# Patient Record
Sex: FEMALE | Race: WHITE | NOT HISPANIC OR LATINO | Employment: OTHER | URBAN - METROPOLITAN AREA
[De-identification: names, ages, dates, MRNs, and addresses within clinical notes are randomized per-mention and may not be internally consistent; named-entity substitution may affect disease eponyms.]

---

## 2024-01-23 ENCOUNTER — TELEPHONE (OUTPATIENT)
Dept: ORTHOPEDICS | Facility: CLINIC | Age: 49
End: 2024-01-23
Payer: COMMERCIAL

## 2024-01-23 NOTE — TELEPHONE ENCOUNTER
Called pt and she stated that she would like to follow up with a trauma doctor since her surgery in 10/2023. She stated she had  fall and had surgery at [facility] and after that her knee has not been right since. Informed pt that I would send message to the trauma doctors teams to get her on their schedule. Pt verbalized understanding and has no further questions.    ----- Message from Ingrid Clemons sent at 1/23/2024  4:34 PM CST -----  Regarding: appt access  Contact: pt [phone]  Pt calling to schedule appt for knee due to significant fall. Pls call

## 2024-01-25 ENCOUNTER — TELEPHONE (OUTPATIENT)
Dept: ORTHOPEDICS | Facility: CLINIC | Age: 49
End: 2024-01-25
Payer: COMMERCIAL

## 2024-01-25 DIAGNOSIS — M89.8X6 PAIN OF RIGHT FIBULA: Primary | ICD-10-CM

## 2024-01-25 NOTE — TELEPHONE ENCOUNTER
----- Message from Ro Adair sent at 1/23/2024  4:59 PM CST -----  Regarding: FW: Appt Access  Contact: [phone]  Hey,   I spoke with this patient and she had a slip and fall in 10/2023 and she had surgery at [facility] for a right fractured tibia. She stated that since the surgery she has had issues with her knee. She would like a second opinion from a trauma doctor. Let me know if you want me to schedule pt and contact patient with appointment scheduling time etc.     Thanks,    Ro Adair MS, OTC   Clinical Assistant to Dr. George Chimento [facility]   Phone: [phone]  Fax: [phone]    ----- Message -----  From: Aleja Arredondo  Sent: 1/22/2024  12:51 PM CST  To: Samina BRONSON Staff  Subject: Appt Access                                      SCHEDULING/REQUEST    Appt Type: NP    Date/Time Preference: Any    Treating Provider: Samina    Caller Name: BRANDON JUNE [[id number]]    Contact Preference:   [phone]    Comments/Notes: NP requesting an appt for right fractured tibia.  No appts avail.  Pt only wants to see Dr Haas.  Please call.

## 2024-01-25 NOTE — TELEPHONE ENCOUNTER
Spoke with pt.  She is requesting a second opinion on a right fibula fx s/p ORIF right proximal fibula 10/2023 at outside facility.  Pt will bring recent CT scan images on a disc.  Pt will get new tib fib xrays upon arrival for appointment.  Pt verbalized understanding.

## 2024-02-01 ENCOUNTER — OFFICE VISIT (OUTPATIENT)
Dept: ORTHOPEDICS | Facility: CLINIC | Age: 49
End: 2024-02-01
Payer: COMMERCIAL

## 2024-02-01 ENCOUNTER — HOSPITAL ENCOUNTER (OUTPATIENT)
Dept: RADIOLOGY | Facility: HOSPITAL | Age: 49
Discharge: HOME | End: 2024-02-01
Attending: ORTHOPAEDIC SURGERY
Payer: COMMERCIAL

## 2024-02-01 DIAGNOSIS — M89.8X6 PAIN OF RIGHT FIBULA: ICD-10-CM

## 2024-02-01 DIAGNOSIS — Z01.818 PREOP EXAMINATION: ICD-10-CM

## 2024-02-01 DIAGNOSIS — M89.8X6 PAIN OF RIGHT FIBULA: Primary | ICD-10-CM

## 2024-02-01 DIAGNOSIS — S82.141K CLOSED FRACTURE OF RIGHT TIBIAL PLATEAU WITH NONUNION: Primary | ICD-10-CM

## 2024-02-01 PROCEDURE — 99999 PR PBB SHADOW E&M-EST. PATIENT-LVL III: CPT | Mod: PBBFAC,,, | Performed by: ORTHOPAEDIC SURGERY

## 2024-02-01 PROCEDURE — 4010F ACE/ARB THERAPY RXD/TAKEN: CPT | Mod: CPTII,S$GLB,, | Performed by: ORTHOPAEDIC SURGERY

## 2024-02-01 PROCEDURE — 99204 OFFICE O/P NEW MOD 45 MIN: CPT | Mod: S$GLB,,, | Performed by: ORTHOPAEDIC SURGERY

## 2024-02-01 PROCEDURE — 73590 X-RAY EXAM OF LOWER LEG: CPT | Mod: 26,RT,, | Performed by: INTERNAL MEDICINE

## 2024-02-01 PROCEDURE — 1159F MED LIST DOCD IN RCRD: CPT | Mod: CPTII,S$GLB,, | Performed by: ORTHOPAEDIC SURGERY

## 2024-02-01 PROCEDURE — 73590 X-RAY EXAM OF LOWER LEG: CPT | Mod: TC,RT

## 2024-02-01 RX ORDER — LISINOPRIL AND HYDROCHLOROTHIAZIDE 20; 25 MG/1; MG/1
1 TABLET ORAL NIGHTLY
COMMUNITY

## 2024-02-01 RX ORDER — DULOXETIN HYDROCHLORIDE 30 MG/1
30 CAPSULE, DELAYED RELEASE ORAL NIGHTLY
COMMUNITY

## 2024-02-01 RX ORDER — TRAZODONE HYDROCHLORIDE 50 MG/1
50 TABLET ORAL NIGHTLY
COMMUNITY

## 2024-02-01 RX ORDER — ZOLPIDEM TARTRATE 5 MG/1
5 TABLET ORAL NIGHTLY PRN
COMMUNITY

## 2024-02-01 RX ORDER — ROSUVASTATIN CALCIUM 20 MG/1
20 TABLET, COATED ORAL NIGHTLY
COMMUNITY

## 2024-02-01 NOTE — PROGRESS NOTES
CC:  Right tibial plateau nonunion      HISTORY       HPI:  48-year-old female, fall from step stool October 7, 2023.  Seen at outside hospital, bluntly [place]  Right bicondylar tibial plateau fracture.  Treated urgently with ORIF, Dr. Garcia, anterior midline incision followed by medial and lateral plating.    Had some wound issues postoperatively requiring wound care and hyperbaric oxygen.  This eventually cleared up.      She has had persistent right knee pain, inability to bear weight since the time of injury.    Knee feels unstable  There is visible gross motion at the fracture site  She is not on antibiotics, but previously was on bactrim and clinda    Lives at home with    Previously independent community ambulator   Self-employed as a realtor   Denies diabetes, heart attack, stroke, blood clot, cancer  Denies tobacco use      ROS:  Constitutional: Denies fever/chills  Neurological: Denies numbness/tingling (any exceptions noted in orthopaedic exam)   Psychiatric/Behavioral: Denies change in normal mood  Eyes: Denies change in vision  Cardiovascular: Denies chest pain  Respiratory: Denies shortness of breath  Hematologic/Lymphatic: Denies easy bleeding/bruising   Skin: Denies new rash or skin lesions   Gastrointestinal: Denies nausea/vomitting/diarrhea, change in bowel habits, abdominal pain   Allergic/Immunologic: Denies adverse reactions to current medications  Musculoskeletal: see HPI    PAST MEDICAL HISTORY: History reviewed. No pertinent past medical history.  PAST SURGICAL HISTORY: History reviewed. No pertinent surgical history.  FAMILY HISTORY: History reviewed. No pertinent family history.  SOCIAL HISTORY:   Social History     Socioeconomic History    Marital status:      MEDICATIONS:   Current Outpatient Medications:     DULoxetine (CYMBALTA) 30 MG capsule, Take 30 mg by mouth once daily., Disp: , Rfl:     lisinopriL-hydrochlorothiazide (PRINZIDE,ZESTORETIC) 20-25 mg Tab, Take  1 tablet by mouth once daily., Disp: , Rfl:     rosuvastatin (CRESTOR) 20 MG tablet, Take 20 mg by mouth once daily., Disp: , Rfl:     traZODone (DESYREL) 50 MG tablet, Take 50 mg by mouth every evening., Disp: , Rfl:     zolpidem (AMBIEN) 5 MG Tab, Take 5 mg by mouth nightly as needed., Disp: , Rfl:     traMADoL (ULTRAM) 50 mg tablet, Take 1 tablet (50 mg total) by mouth every 8 (eight) hours as needed for Pain., Disp: 21 tablet, Rfl: 0  ALLERGIES: Review of patient's allergies indicates:  No Known Allergies      EXAM      VITAL SIGNS:   There were no vitals taken for this visit.      PE:  General:  no acute distress, appears stated age    Neuro: alert and oriented x3  Psych: normal mood  Head: normocephalic, atraumatic.   Eyes: no scleral icterus  Mouth: moist mucous membranes  Cardiovascular: extremities warm and well perfused  Lungs: breathing comfortably, equal chest rise bilat  Skin: clean, dry, intact (any exceptions noted in below musculoskeletal exam)    Musculoskeletal:  Right lower extremity   Midline anterior knee incision that is well healed, though at the inferior lateral aspect there was a prior wound that appears to be healed now.  There was gross motion at the fracture site metaphyseal region  No real erythema, fluctuance or knee effusion at this time  Unable to perform a straight leg raise due to pain and fracture motion  Motor function intact  hamstring, gastroc, tibialis anterior, peroneal, EHL, FHL  Sensation intact to light touch saphenous, sural, deep peroneal, superficial peroneal, tibial nerves.  Palpable DP/PT pulse, brisk cap refill        XRAYS:  X-ray right tibia demonstrate prior bicondylar tibial plateau fracture with medial and lateral plates as well as multiple independent screws.  The articular surface appears to be reasonably well aligned.  There is some interval healing of the proximal fibula fracture.  There was a nonunion at the metaphyseal portion of the tibial plateau/proximal  tibia, apex anterior angulation with a gap of approximately 12 mm along the anterior tibial cortex.  (I independently reviewed and interpreted the above imaging)      Lab Results   Component Value Date    CRP 3.1 02/01/2024     Lab Results   Component Value Date    SEDRATE 23 02/01/2024       Lab Results   Component Value Date    WBC 8.84 02/01/2024    HGB 14.2 02/01/2024    HCT 45.5 02/01/2024    MCV 91 02/01/2024     02/01/2024         CMP  Sodium   Date Value Ref Range Status   02/01/2024 138 136 - 145 mmol/L Final     Potassium   Date Value Ref Range Status   02/01/2024 3.8 3.5 - 5.1 mmol/L Final     Chloride   Date Value Ref Range Status   02/01/2024 104 95 - 110 mmol/L Final     CO2   Date Value Ref Range Status   02/01/2024 25 23 - 29 mmol/L Final     Glucose   Date Value Ref Range Status   02/01/2024 98 70 - 110 mg/dL Final     BUN   Date Value Ref Range Status   02/01/2024 17 6 - 20 mg/dL Final     Creatinine   Date Value Ref Range Status   02/01/2024 0.8 0.5 - 1.4 mg/dL Final     Calcium   Date Value Ref Range Status   02/01/2024 10.4 8.7 - 10.5 mg/dL Final     Total Protein   Date Value Ref Range Status   02/01/2024 7.7 6.0 - 8.4 g/dL Final     Albumin   Date Value Ref Range Status   02/01/2024 4.1 3.5 - 5.2 g/dL Final     Total Bilirubin   Date Value Ref Range Status   02/01/2024 0.3 0.1 - 1.0 mg/dL Final     Comment:     For infants and newborns, interpretation of results should be based  on gestational age, weight and in agreement with clinical  observations.    Premature Infant recommended reference ranges:  Up to 24 hours.............<8.0 mg/dL  Up to 48 hours............<12.0 mg/dL  3-5 days..................<15.0 mg/dL  6-29 days.................<15.0 mg/dL       Alkaline Phosphatase   Date Value Ref Range Status   02/01/2024 122 55 - 135 U/L Final     AST   Date Value Ref Range Status   02/01/2024 26 10 - 40 U/L Final     ALT   Date Value Ref Range Status   02/01/2024 44 10 - 44 U/L Final      Anion Gap   Date Value Ref Range Status   02/01/2024 9 8 - 16 mmol/L Final     eGFR   Date Value Ref Range Status   02/01/2024 >60.0 >60 mL/min/1.73 m^2 Final         MEDICAL DECISION MAKING       Encounter Diagnoses   Name Primary?    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    Now has continued gross motion at fracture site as well as nonunion on x-ray.  History of wound problems requiring wound care and antibiotics.      I have concern that this represents an infected nonunion.  Regardless there is significant nonunion with gross motion at the fracture site.      I do not feel that this would heal with non operative management due to this persistent gross motion.    Counseled patient on diagnosis     Discussed non operative management   This would consist of splinting, bracing.  I have concern that this would not be successful due to concern for infection and significant motion at the fracture site.    Discussed operative intervention   Due to concern for her infection in the setting of unstable hardware and gross fracture motion I would recommend the following   Worked through the prior anterior knee incision and remove all the hardware.  Debride any bone that appears to be devitalized or infected.  Take multiple cultures.  Hopefully we can then realign the fracture, and perform a antibiotic coated nail for control the metaphyseal fracture zone.  If there is gross infection throughout this whole area, we may have to perform temporizing measure with an external fixator.  We may be later required to convert to a ringed external fixator depending on soft tissue quality fracture characteristics, bone loss, etc..  Due to prior wound issues, anterior knee incision, the wound may not be closable,  where she may have further wound complications.  With this in mind, she may require assistance from Plastic surgery to close these wounds.    I discussed that regardless of the future treatment, given the unstable high energy type fracture prior wound complications, she is at risk for loss of limb, and above-knee amputation.  My main focus is decreasing the risk of this complication.  Hopefully with operative intervention, removing all the hardware, hopefully decreasing nidus for infection, improving the alignment, and the stability, we could both eradicate the infection and cause bony healing, improving her early and long-term function of this lower extremity.    As an alternative surgical procedure I also discussed keeping this current hardware in place, supplemented it with additional fixation, however given my concern for possible deep infection with grossly unstable fixation, I feel that this treatment would both compromise my goal of removing any and all infection, as well as improving stability, as the current hardware that is in place limits the possibility of getting robust enough fixation without its removal    Plan for revision surgery later this month  Will discuss w/ plastic surgery the potential of soft tissue coverage needs    --weight bearing:  Nonweightbearing right lower extremity  --followup:  For surgery        =====================  Nicho Balderas MD  Orthopaedic Surgery

## 2024-02-02 ENCOUNTER — TELEPHONE (OUTPATIENT)
Dept: ORTHOPEDICS | Facility: CLINIC | Age: 49
End: 2024-02-02
Payer: COMMERCIAL

## 2024-02-02 NOTE — TELEPHONE ENCOUNTER
Spoke with pt stated [place]. Stated to pt to find a pharmacy in [place]. Pt verbalize understands.

## 2024-02-02 NOTE — TELEPHONE ENCOUNTER
----- Message from Kasey Snyder sent at 2/2/2024  2:07 PM CST -----  Regarding: pt advice  Contact: [phone]  Pt requesting Pharmacy update    [facility] [facility]   Phone: [phone]  Fax: [phone]    Please call pt @[phone]

## 2024-02-05 DIAGNOSIS — S82.141K CLOSED FRACTURE OF RIGHT TIBIAL PLATEAU WITH NONUNION: Primary | ICD-10-CM

## 2024-02-05 RX ORDER — TRAMADOL HYDROCHLORIDE 50 MG/1
50 TABLET ORAL EVERY 8 HOURS PRN
Qty: 21 TABLET | Refills: 0 | Status: SHIPPED | OUTPATIENT
Start: 2024-02-05 | End: 2024-02-13 | Stop reason: SDUPTHER

## 2024-02-07 ENCOUNTER — PATIENT MESSAGE (OUTPATIENT)
Dept: ORTHOPEDICS | Facility: CLINIC | Age: 49
End: 2024-02-07
Payer: COMMERCIAL

## 2024-02-13 DIAGNOSIS — S82.141K CLOSED FRACTURE OF RIGHT TIBIAL PLATEAU WITH NONUNION: ICD-10-CM

## 2024-02-14 ENCOUNTER — PATIENT MESSAGE (OUTPATIENT)
Dept: ORTHOPEDICS | Facility: CLINIC | Age: 49
End: 2024-02-14
Payer: COMMERCIAL

## 2024-02-14 RX ORDER — TRAMADOL HYDROCHLORIDE 50 MG/1
50 TABLET ORAL EVERY 8 HOURS PRN
Qty: 21 TABLET | Refills: 0 | Status: SHIPPED | OUTPATIENT
Start: 2024-02-14 | End: 2024-05-29 | Stop reason: SDUPTHER

## 2024-02-15 NOTE — PRE-PROCEDURE INSTRUCTIONS
PREOP INSTRUCTIONS:  No food,milk or milk products for 8 hours before surgery.  Clear liquids like water,gatorade,apple juice are allowed up until 2 hours before surgery.  Instructed to follow the surgeon's instructions if they differ from these.  Shower instructions as well as directions to the Surgery Center were given.  Encouraged to wear loose fitting,comfortable clothing.  Medication instructions for pm prior to and am of procedure reviewed.  Instructed to avoid taking vitamins,supplements,aspirin and ibuprofen the morning of surgery.    Patient denies any side effects or issues with anesthesia or sedation.     Patient does not know arrival time.Explained that this information comes from the surgeon's office and if they haven't heard from them by 2 or 3 pm 2/16/2024 to call the office.Patient stated an understanding.

## 2024-02-16 ENCOUNTER — ANESTHESIA EVENT (OUTPATIENT)
Dept: SURGERY | Facility: HOSPITAL | Age: 49
DRG: 493 | End: 2024-02-16
Payer: COMMERCIAL

## 2024-02-16 ENCOUNTER — TELEPHONE (OUTPATIENT)
Dept: ORTHOPEDICS | Facility: CLINIC | Age: 49
End: 2024-02-16
Payer: COMMERCIAL

## 2024-02-16 NOTE — TELEPHONE ENCOUNTER
----- Message from Landen Vazquez sent at 2/16/2024  1:09 PM CST -----  Regarding: PT IS CALLING FOR THE ARRIVAL TIME FOR MONDAYS SURGERY  Contact: PT  Confirmed contact info below:  Contact Name: Neeta Perryher  Phone Number: [phone]

## 2024-02-16 NOTE — TELEPHONE ENCOUNTER
Spoke with pt in regards to sx scheduled for Monday 2/19/2024 stated to pt no eating/drinking after midnight arrival time 5am and she is to report to 2nd floor [facility]. Pt verbalize understands.

## 2024-02-19 ENCOUNTER — ANESTHESIA (OUTPATIENT)
Dept: SURGERY | Facility: HOSPITAL | Age: 49
DRG: 493 | End: 2024-02-19
Payer: COMMERCIAL

## 2024-02-19 ENCOUNTER — HOSPITAL ENCOUNTER (INPATIENT)
Facility: HOSPITAL | Age: 49
LOS: 2 days | Discharge: HOME | DRG: 493 | End: 2024-02-21
Attending: ORTHOPAEDIC SURGERY | Admitting: ORTHOPAEDIC SURGERY
Payer: COMMERCIAL

## 2024-02-19 DIAGNOSIS — S82.141K CLOSED FRACTURE OF RIGHT TIBIAL PLATEAU WITH NONUNION: ICD-10-CM

## 2024-02-19 LAB
ALBUMIN SERPL BCP-MCNC: 3.4 G/DL (ref 3.5–5.2)
ALP SERPL-CCNC: 91 U/L (ref 55–135)
ALT SERPL W/O P-5'-P-CCNC: 33 U/L (ref 10–44)
ANION GAP SERPL CALC-SCNC: 13 MMOL/L (ref 8–16)
AST SERPL-CCNC: 26 U/L (ref 10–40)
B-HCG UR QL: NEGATIVE
BILIRUB SERPL-MCNC: 0.2 MG/DL (ref 0.1–1)
BUN SERPL-MCNC: 16 MG/DL (ref 6–20)
CALCIUM SERPL-MCNC: 8.8 MG/DL (ref 8.7–10.5)
CHLORIDE SERPL-SCNC: 108 MMOL/L (ref 95–110)
CO2 SERPL-SCNC: 18 MMOL/L (ref 23–29)
CREAT SERPL-MCNC: 0.8 MG/DL (ref 0.5–1.4)
CTP QC/QA: YES
EST. GFR  (NO RACE VARIABLE): >60 ML/MIN/1.73 M^2
GLUCOSE SERPL-MCNC: 154 MG/DL (ref 70–110)
GRAM STN SPEC: NORMAL
POTASSIUM SERPL-SCNC: 4.1 MMOL/L (ref 3.5–5.1)
PROT SERPL-MCNC: 6.3 G/DL (ref 6–8.4)
SODIUM SERPL-SCNC: 139 MMOL/L (ref 136–145)

## 2024-02-19 PROCEDURE — 64450 NJX AA&/STRD OTHER PN/BRANCH: CPT | Mod: 59,RT,, | Performed by: SURGERY

## 2024-02-19 PROCEDURE — 36000710: Performed by: ORTHOPAEDIC SURGERY

## 2024-02-19 PROCEDURE — 87205 SMEAR GRAM STAIN: CPT | Mod: 59 | Performed by: ORTHOPAEDIC SURGERY

## 2024-02-19 PROCEDURE — 63600175 PHARM REV CODE 636 W HCPCS: Performed by: STUDENT IN AN ORGANIZED HEALTH CARE EDUCATION/TRAINING PROGRAM

## 2024-02-19 PROCEDURE — 37000009 HC ANESTHESIA EA ADD 15 MINS: Performed by: ORTHOPAEDIC SURGERY

## 2024-02-19 PROCEDURE — 25000003 PHARM REV CODE 250: Performed by: ANESTHESIOLOGY

## 2024-02-19 PROCEDURE — 80053 COMPREHEN METABOLIC PANEL: CPT | Performed by: ORTHOPAEDIC SURGERY

## 2024-02-19 PROCEDURE — 25000003 PHARM REV CODE 250: Performed by: ORTHOPAEDIC SURGERY

## 2024-02-19 PROCEDURE — D9220A PRA ANESTHESIA: Mod: ANES,,, | Performed by: ANESTHESIOLOGY

## 2024-02-19 PROCEDURE — 25000003 PHARM REV CODE 250: Performed by: NURSE ANESTHETIST, CERTIFIED REGISTERED

## 2024-02-19 PROCEDURE — 0QSG06Z REPOSITION RIGHT TIBIA WITH INTRAMEDULLARY INTERNAL FIXATION DEVICE, OPEN APPROACH: ICD-10-PCS | Performed by: ORTHOPAEDIC SURGERY

## 2024-02-19 PROCEDURE — 63600175 PHARM REV CODE 636 W HCPCS: Performed by: NURSE ANESTHETIST, CERTIFIED REGISTERED

## 2024-02-19 PROCEDURE — 63600175 PHARM REV CODE 636 W HCPCS: Performed by: SURGERY

## 2024-02-19 PROCEDURE — 63600175 PHARM REV CODE 636 W HCPCS: Performed by: ORTHOPAEDIC SURGERY

## 2024-02-19 PROCEDURE — 0QSG04Z REPOSITION RIGHT TIBIA WITH INTERNAL FIXATION DEVICE, OPEN APPROACH: ICD-10-PCS | Performed by: ORTHOPAEDIC SURGERY

## 2024-02-19 PROCEDURE — 87070 CULTURE OTHR SPECIMN AEROBIC: CPT | Performed by: ORTHOPAEDIC SURGERY

## 2024-02-19 PROCEDURE — A6010 COLLAGEN BASED WOUND FILLER: HCPCS | Performed by: ORTHOPAEDIC SURGERY

## 2024-02-19 PROCEDURE — 87116 MYCOBACTERIA CULTURE: CPT | Performed by: ORTHOPAEDIC SURGERY

## 2024-02-19 PROCEDURE — C1889 IMPLANT/INSERT DEVICE, NOC: HCPCS | Performed by: ORTHOPAEDIC SURGERY

## 2024-02-19 PROCEDURE — 71000016 HC POSTOP RECOV ADDL HR: Performed by: ORTHOPAEDIC SURGERY

## 2024-02-19 PROCEDURE — 27724 REPAIR/GRAFT OF TIBIA: CPT | Mod: RT,,, | Performed by: ORTHOPAEDIC SURGERY

## 2024-02-19 PROCEDURE — 81025 URINE PREGNANCY TEST: CPT | Performed by: ORTHOPAEDIC SURGERY

## 2024-02-19 PROCEDURE — 36415 COLL VENOUS BLD VENIPUNCTURE: CPT | Performed by: ORTHOPAEDIC SURGERY

## 2024-02-19 PROCEDURE — 87102 FUNGUS ISOLATION CULTURE: CPT | Performed by: ORTHOPAEDIC SURGERY

## 2024-02-19 PROCEDURE — 36000711: Performed by: ORTHOPAEDIC SURGERY

## 2024-02-19 PROCEDURE — 71000039 HC RECOVERY, EACH ADD'L HOUR: Performed by: ORTHOPAEDIC SURGERY

## 2024-02-19 PROCEDURE — 25000003 PHARM REV CODE 250: Performed by: STUDENT IN AN ORGANIZED HEALTH CARE EDUCATION/TRAINING PROGRAM

## 2024-02-19 PROCEDURE — 27201423 OPTIME MED/SURG SUP & DEVICES STERILE SUPPLY: Performed by: ORTHOPAEDIC SURGERY

## 2024-02-19 PROCEDURE — 87206 SMEAR FLUORESCENT/ACID STAI: CPT | Mod: 91 | Performed by: ORTHOPAEDIC SURGERY

## 2024-02-19 PROCEDURE — 71000033 HC RECOVERY, INTIAL HOUR: Performed by: ORTHOPAEDIC SURGERY

## 2024-02-19 PROCEDURE — 71000015 HC POSTOP RECOV 1ST HR: Performed by: ORTHOPAEDIC SURGERY

## 2024-02-19 PROCEDURE — 15738 MUSCLE-SKIN GRAFT LEG: CPT | Mod: 51,RT,, | Performed by: ORTHOPAEDIC SURGERY

## 2024-02-19 PROCEDURE — 0QPG04Z REMOVAL OF INTERNAL FIXATION DEVICE FROM RIGHT TIBIA, OPEN APPROACH: ICD-10-PCS | Performed by: ORTHOPAEDIC SURGERY

## 2024-02-19 PROCEDURE — 0QUG0KZ SUPPLEMENT RIGHT TIBIA WITH NONAUTOLOGOUS TISSUE SUBSTITUTE, OPEN APPROACH: ICD-10-PCS | Performed by: ORTHOPAEDIC SURGERY

## 2024-02-19 PROCEDURE — 0JXN0ZZ TRANSFER RIGHT LOWER LEG SUBCUTANEOUS TISSUE AND FASCIA, OPEN APPROACH: ICD-10-PCS | Performed by: ORTHOPAEDIC SURGERY

## 2024-02-19 PROCEDURE — D9220A PRA ANESTHESIA: Mod: CRNA,,, | Performed by: NURSE ANESTHETIST, CERTIFIED REGISTERED

## 2024-02-19 PROCEDURE — 11000001 HC ACUTE MED/SURG PRIVATE ROOM

## 2024-02-19 PROCEDURE — C1713 ANCHOR/SCREW BN/BN,TIS/BN: HCPCS | Performed by: ORTHOPAEDIC SURGERY

## 2024-02-19 PROCEDURE — 63600175 PHARM REV CODE 636 W HCPCS: Performed by: ANESTHESIOLOGY

## 2024-02-19 PROCEDURE — 37000008 HC ANESTHESIA 1ST 15 MINUTES: Performed by: ORTHOPAEDIC SURGERY

## 2024-02-19 PROCEDURE — 94761 N-INVAS EAR/PLS OXIMETRY MLT: CPT

## 2024-02-19 PROCEDURE — 63600175 PHARM REV CODE 636 W HCPCS

## 2024-02-19 PROCEDURE — 0QUG07Z SUPPLEMENT RIGHT TIBIA WITH AUTOLOGOUS TISSUE SUBSTITUTE, OPEN APPROACH: ICD-10-PCS | Performed by: ORTHOPAEDIC SURGERY

## 2024-02-19 PROCEDURE — 87075 CULTR BACTERIA EXCEPT BLOOD: CPT | Performed by: ORTHOPAEDIC SURGERY

## 2024-02-19 PROCEDURE — 64447 NJX AA&/STRD FEMORAL NRV IMG: CPT | Mod: 59,RT,, | Performed by: SURGERY

## 2024-02-19 PROCEDURE — C1769 GUIDE WIRE: HCPCS | Performed by: ORTHOPAEDIC SURGERY

## 2024-02-19 DEVICE — BONE CHIP CANC 1.7-10MM 15ML: Type: IMPLANTABLE DEVICE | Site: TIBIA | Status: FUNCTIONAL

## 2024-02-19 DEVICE — SCREW LOCKING BONE T2 5X47MM: Type: IMPLANTABLE DEVICE | Site: TIBIA | Status: FUNCTIONAL

## 2024-02-19 DEVICE — GRAFT VITOSS BONE SUB BBT 5CC: Type: IMPLANTABLE DEVICE | Site: TIBIA | Status: FUNCTIONAL

## 2024-02-19 DEVICE — SCREW LOCKING BONE T2 5X70MM: Type: IMPLANTABLE DEVICE | Site: TIBIA | Status: FUNCTIONAL

## 2024-02-19 DEVICE — SCREW BONE NON LOCK 3.5X34MM: Type: IMPLANTABLE DEVICE | Site: TIBIA | Status: FUNCTIONAL

## 2024-02-19 DEVICE — SCREW LOCKING BONE T2 5X50MM: Type: IMPLANTABLE DEVICE | Site: TIBIA | Status: FUNCTIONAL

## 2024-02-19 DEVICE — COLLAGEN CELLERATE ACTIVATED 1GM: Type: IMPLANTABLE DEVICE | Site: TIBIA | Status: FUNCTIONAL

## 2024-02-19 DEVICE — IMPLANTABLE DEVICE: Type: IMPLANTABLE DEVICE | Site: TIBIA | Status: FUNCTIONAL

## 2024-02-19 DEVICE — SCREW LOCKING BONE T2 5X37MM: Type: IMPLANTABLE DEVICE | Site: TIBIA | Status: FUNCTIONAL

## 2024-02-19 DEVICE — CEMENT BONE WHOLE BATCH: Type: IMPLANTABLE DEVICE | Site: TIBIA | Status: FUNCTIONAL

## 2024-02-19 RX ORDER — HALOPERIDOL 5 MG/ML
0.5 INJECTION INTRAMUSCULAR EVERY 10 MIN PRN
Status: DISCONTINUED | OUTPATIENT
Start: 2024-02-19 | End: 2024-02-19 | Stop reason: HOSPADM

## 2024-02-19 RX ORDER — AMOXICILLIN 250 MG
1 CAPSULE ORAL DAILY
Status: DISCONTINUED | OUTPATIENT
Start: 2024-02-19 | End: 2024-02-21 | Stop reason: HOSPADM

## 2024-02-19 RX ORDER — POLYETHYLENE GLYCOL 3350 17 G/17G
17 POWDER, FOR SOLUTION ORAL DAILY PRN
Status: DISCONTINUED | OUTPATIENT
Start: 2024-02-19 | End: 2024-02-21

## 2024-02-19 RX ORDER — FENTANYL CITRATE 50 UG/ML
INJECTION, SOLUTION INTRAMUSCULAR; INTRAVENOUS
Status: COMPLETED
Start: 2024-02-19 | End: 2024-02-19

## 2024-02-19 RX ORDER — ACETAMINOPHEN 500 MG
1000 TABLET ORAL EVERY 6 HOURS
Status: DISCONTINUED | OUTPATIENT
Start: 2024-02-19 | End: 2024-02-21 | Stop reason: HOSPADM

## 2024-02-19 RX ORDER — SCOLOPAMINE TRANSDERMAL SYSTEM 1 MG/1
1 PATCH, EXTENDED RELEASE TRANSDERMAL
Status: DISCONTINUED | OUTPATIENT
Start: 2024-02-19 | End: 2024-02-21 | Stop reason: HOSPADM

## 2024-02-19 RX ORDER — DULOXETIN HYDROCHLORIDE 30 MG/1
30 CAPSULE, DELAYED RELEASE ORAL NIGHTLY
Status: DISCONTINUED | OUTPATIENT
Start: 2024-02-19 | End: 2024-02-21 | Stop reason: HOSPADM

## 2024-02-19 RX ORDER — ACETAMINOPHEN 10 MG/ML
INJECTION, SOLUTION INTRAVENOUS
Status: DISCONTINUED | OUTPATIENT
Start: 2024-02-19 | End: 2024-02-19

## 2024-02-19 RX ORDER — FENTANYL CITRATE 50 UG/ML
INJECTION, SOLUTION INTRAMUSCULAR; INTRAVENOUS
Status: DISCONTINUED | OUTPATIENT
Start: 2024-02-19 | End: 2024-02-19

## 2024-02-19 RX ORDER — ATORVASTATIN CALCIUM 40 MG/1
40 TABLET, FILM COATED ORAL DAILY
Status: DISCONTINUED | OUTPATIENT
Start: 2024-02-19 | End: 2024-02-21 | Stop reason: HOSPADM

## 2024-02-19 RX ORDER — OXYCODONE HYDROCHLORIDE 5 MG/1
5 TABLET ORAL EVERY 6 HOURS PRN
Qty: 42 TABLET | Refills: 0 | Status: SHIPPED | OUTPATIENT
Start: 2024-02-19 | End: 2024-02-21 | Stop reason: HOSPADM

## 2024-02-19 RX ORDER — MUPIROCIN 20 MG/G
OINTMENT TOPICAL
Status: DISCONTINUED | OUTPATIENT
Start: 2024-02-19 | End: 2024-02-19 | Stop reason: HOSPADM

## 2024-02-19 RX ORDER — OXYCODONE HYDROCHLORIDE 5 MG/1
5 TABLET ORAL EVERY 4 HOURS PRN
Status: DISCONTINUED | OUTPATIENT
Start: 2024-02-19 | End: 2024-02-21 | Stop reason: HOSPADM

## 2024-02-19 RX ORDER — ROCURONIUM BROMIDE 10 MG/ML
INJECTION, SOLUTION INTRAVENOUS
Status: DISCONTINUED | OUTPATIENT
Start: 2024-02-19 | End: 2024-02-19

## 2024-02-19 RX ORDER — LABETALOL HYDROCHLORIDE 5 MG/ML
INJECTION, SOLUTION INTRAVENOUS
Status: DISCONTINUED | OUTPATIENT
Start: 2024-02-19 | End: 2024-02-19

## 2024-02-19 RX ORDER — ROPIVACAINE HYDROCHLORIDE 5 MG/ML
INJECTION, SOLUTION EPIDURAL; INFILTRATION; PERINEURAL
Status: COMPLETED | OUTPATIENT
Start: 2024-02-19 | End: 2024-02-19

## 2024-02-19 RX ORDER — OXYCODONE HYDROCHLORIDE 10 MG/1
10 TABLET ORAL EVERY 4 HOURS PRN
Status: DISCONTINUED | OUTPATIENT
Start: 2024-02-19 | End: 2024-02-21 | Stop reason: HOSPADM

## 2024-02-19 RX ORDER — ASPIRIN 81 MG/1
81 TABLET ORAL 2 TIMES DAILY
Qty: 60 TABLET | Refills: 0 | Status: SHIPPED | OUTPATIENT
Start: 2024-02-19 | End: 2024-03-22

## 2024-02-19 RX ORDER — ONDANSETRON HYDROCHLORIDE 2 MG/ML
INJECTION, SOLUTION INTRAVENOUS
Status: DISCONTINUED | OUTPATIENT
Start: 2024-02-19 | End: 2024-02-19

## 2024-02-19 RX ORDER — DOXYCYCLINE 100 MG/1
100 CAPSULE ORAL 2 TIMES DAILY
Qty: 28 CAPSULE | Refills: 0 | Status: SHIPPED | OUTPATIENT
Start: 2024-02-19 | End: 2024-03-12 | Stop reason: SDUPTHER

## 2024-02-19 RX ORDER — TALC
6 POWDER (GRAM) TOPICAL NIGHTLY PRN
Status: DISCONTINUED | OUTPATIENT
Start: 2024-02-19 | End: 2024-02-21 | Stop reason: HOSPADM

## 2024-02-19 RX ORDER — FENTANYL CITRATE 50 UG/ML
25-200 INJECTION, SOLUTION INTRAMUSCULAR; INTRAVENOUS
Status: DISCONTINUED | OUTPATIENT
Start: 2024-02-19 | End: 2024-02-19

## 2024-02-19 RX ORDER — VANCOMYCIN HYDROCHLORIDE 1 G/20ML
INJECTION, POWDER, LYOPHILIZED, FOR SOLUTION INTRAVENOUS
Status: DISCONTINUED | OUTPATIENT
Start: 2024-02-19 | End: 2024-02-19 | Stop reason: HOSPADM

## 2024-02-19 RX ORDER — METHOCARBAMOL 750 MG/1
750 TABLET, FILM COATED ORAL 3 TIMES DAILY
Status: DISCONTINUED | OUTPATIENT
Start: 2024-02-19 | End: 2024-02-21 | Stop reason: HOSPADM

## 2024-02-19 RX ORDER — CEFAZOLIN 2 G/1
INJECTION, POWDER, FOR SOLUTION INTRAMUSCULAR; INTRAVENOUS
Status: DISCONTINUED | OUTPATIENT
Start: 2024-02-19 | End: 2024-02-19

## 2024-02-19 RX ORDER — DEXAMETHASONE SODIUM PHOSPHATE 4 MG/ML
INJECTION, SOLUTION INTRA-ARTICULAR; INTRALESIONAL; INTRAMUSCULAR; INTRAVENOUS; SOFT TISSUE
Status: DISCONTINUED | OUTPATIENT
Start: 2024-02-19 | End: 2024-02-19

## 2024-02-19 RX ORDER — DEXMEDETOMIDINE HYDROCHLORIDE 100 UG/ML
INJECTION, SOLUTION INTRAVENOUS
Status: DISCONTINUED | OUTPATIENT
Start: 2024-02-19 | End: 2024-02-19

## 2024-02-19 RX ORDER — MORPHINE SULFATE 2 MG/ML
2 INJECTION, SOLUTION INTRAMUSCULAR; INTRAVENOUS
Status: DISCONTINUED | OUTPATIENT
Start: 2024-02-19 | End: 2024-02-20

## 2024-02-19 RX ORDER — HYDROMORPHONE HYDROCHLORIDE 1 MG/ML
0.2 INJECTION, SOLUTION INTRAMUSCULAR; INTRAVENOUS; SUBCUTANEOUS EVERY 5 MIN PRN
Status: DISCONTINUED | OUTPATIENT
Start: 2024-02-19 | End: 2024-02-19 | Stop reason: HOSPADM

## 2024-02-19 RX ORDER — TRANEXAMIC ACID 100 MG/ML
INJECTION, SOLUTION INTRAVENOUS
Status: DISCONTINUED | OUTPATIENT
Start: 2024-02-19 | End: 2024-02-19

## 2024-02-19 RX ORDER — PROPOFOL 10 MG/ML
VIAL (ML) INTRAVENOUS
Status: DISCONTINUED | OUTPATIENT
Start: 2024-02-19 | End: 2024-02-19

## 2024-02-19 RX ORDER — MIDAZOLAM HYDROCHLORIDE 1 MG/ML
.5-4 INJECTION INTRAMUSCULAR; INTRAVENOUS
Status: DISPENSED | OUTPATIENT
Start: 2024-02-19

## 2024-02-19 RX ORDER — LIDOCAINE HYDROCHLORIDE 20 MG/ML
INJECTION, SOLUTION EPIDURAL; INFILTRATION; INTRACAUDAL; PERINEURAL
Status: DISCONTINUED | OUTPATIENT
Start: 2024-02-19 | End: 2024-02-19

## 2024-02-19 RX ORDER — LIDOCAINE HYDROCHLORIDE 10 MG/ML
1 INJECTION, SOLUTION EPIDURAL; INFILTRATION; INTRACAUDAL; PERINEURAL ONCE AS NEEDED
Status: COMPLETED | OUTPATIENT
Start: 2024-02-19 | End: 2024-02-19

## 2024-02-19 RX ORDER — SODIUM CHLORIDE 9 MG/ML
INJECTION, SOLUTION INTRAVENOUS CONTINUOUS
Status: DISCONTINUED | OUTPATIENT
Start: 2024-02-19 | End: 2024-02-21 | Stop reason: HOSPADM

## 2024-02-19 RX ORDER — ONDANSETRON HYDROCHLORIDE 2 MG/ML
4 INJECTION, SOLUTION INTRAVENOUS DAILY PRN
Status: DISCONTINUED | OUTPATIENT
Start: 2024-02-19 | End: 2024-02-19 | Stop reason: HOSPADM

## 2024-02-19 RX ORDER — SODIUM CHLORIDE 0.9 % (FLUSH) 0.9 %
10 SYRINGE (ML) INJECTION
Status: DISCONTINUED | OUTPATIENT
Start: 2024-02-19 | End: 2024-02-21 | Stop reason: HOSPADM

## 2024-02-19 RX ORDER — MIDAZOLAM HYDROCHLORIDE 1 MG/ML
INJECTION, SOLUTION INTRAMUSCULAR; INTRAVENOUS
Status: DISCONTINUED | OUTPATIENT
Start: 2024-02-19 | End: 2024-02-19

## 2024-02-19 RX ORDER — ASPIRIN 81 MG/1
81 TABLET ORAL 2 TIMES DAILY
Status: DISCONTINUED | OUTPATIENT
Start: 2024-02-19 | End: 2024-02-21 | Stop reason: HOSPADM

## 2024-02-19 RX ORDER — METHOCARBAMOL 750 MG/1
750 TABLET, FILM COATED ORAL 3 TIMES DAILY PRN
Qty: 21 TABLET | Refills: 0 | Status: SHIPPED | OUTPATIENT
Start: 2024-02-19 | End: 2024-02-27 | Stop reason: SDUPTHER

## 2024-02-19 RX ORDER — ACETAMINOPHEN 500 MG
1000 TABLET ORAL EVERY 8 HOURS
Qty: 60 TABLET | Refills: 0 | Status: SHIPPED | OUTPATIENT
Start: 2024-02-19 | End: 2024-03-20

## 2024-02-19 RX ORDER — KETAMINE HCL IN 0.9 % NACL 50 MG/5 ML
SYRINGE (ML) INTRAVENOUS
Status: DISCONTINUED | OUTPATIENT
Start: 2024-02-19 | End: 2024-02-19

## 2024-02-19 RX ORDER — ONDANSETRON 4 MG/1
8 TABLET, FILM COATED ORAL EVERY 6 HOURS PRN
Status: DISCONTINUED | OUTPATIENT
Start: 2024-02-19 | End: 2024-02-21 | Stop reason: HOSPADM

## 2024-02-19 RX ORDER — TRAMADOL HYDROCHLORIDE 50 MG/1
50 TABLET ORAL EVERY 6 HOURS PRN
Status: DISCONTINUED | OUTPATIENT
Start: 2024-02-19 | End: 2024-02-21 | Stop reason: HOSPADM

## 2024-02-19 RX ORDER — FENTANYL CITRATE 50 UG/ML
25 INJECTION, SOLUTION INTRAMUSCULAR; INTRAVENOUS EVERY 5 MIN PRN
Status: DISCONTINUED | OUTPATIENT
Start: 2024-02-19 | End: 2024-02-19 | Stop reason: HOSPADM

## 2024-02-19 RX ORDER — TOBRAMYCIN 1.2 G/30ML
INJECTION, POWDER, LYOPHILIZED, FOR SOLUTION INTRAVENOUS
Status: DISCONTINUED | OUTPATIENT
Start: 2024-02-19 | End: 2024-02-19 | Stop reason: HOSPADM

## 2024-02-19 RX ORDER — TRAZODONE HYDROCHLORIDE 50 MG/1
50 TABLET ORAL NIGHTLY PRN
Status: DISCONTINUED | OUTPATIENT
Start: 2024-02-19 | End: 2024-02-21 | Stop reason: HOSPADM

## 2024-02-19 RX ADMIN — ROPIVACAINE HYDROCHLORIDE 30 ML: 5 INJECTION EPIDURAL; INFILTRATION; PERINEURAL at 07:02

## 2024-02-19 RX ADMIN — VANCOMYCIN HYDROCHLORIDE 1500 MG: 1.5 INJECTION, POWDER, LYOPHILIZED, FOR SOLUTION INTRAVENOUS at 08:02

## 2024-02-19 RX ADMIN — FENTANYL CITRATE 50 MCG: 50 INJECTION INTRAMUSCULAR; INTRAVENOUS at 01:02

## 2024-02-19 RX ADMIN — CEFAZOLIN 3 G: 330 INJECTION, POWDER, FOR SOLUTION INTRAMUSCULAR; INTRAVENOUS at 12:02

## 2024-02-19 RX ADMIN — METHOCARBAMOL 750 MG: 750 TABLET ORAL at 08:02

## 2024-02-19 RX ADMIN — ATORVASTATIN CALCIUM 40 MG: 40 TABLET, FILM COATED ORAL at 03:02

## 2024-02-19 RX ADMIN — PROPOFOL 50 MG: 10 INJECTION, EMULSION INTRAVENOUS at 09:02

## 2024-02-19 RX ADMIN — OXYCODONE HYDROCHLORIDE 5 MG: 5 TABLET ORAL at 08:02

## 2024-02-19 RX ADMIN — SODIUM CHLORIDE 1000 MG: 9 INJECTION, SOLUTION INTRAVENOUS at 01:02

## 2024-02-19 RX ADMIN — HYDROMORPHONE HYDROCHLORIDE 0.2 MG: 1 INJECTION, SOLUTION INTRAMUSCULAR; INTRAVENOUS; SUBCUTANEOUS at 03:02

## 2024-02-19 RX ADMIN — OXYCODONE HYDROCHLORIDE 10 MG: 10 TABLET ORAL at 03:02

## 2024-02-19 RX ADMIN — ROCURONIUM BROMIDE 20 MG: 10 INJECTION INTRAVENOUS at 01:02

## 2024-02-19 RX ADMIN — ROCURONIUM BROMIDE 20 MG: 10 INJECTION INTRAVENOUS at 08:02

## 2024-02-19 RX ADMIN — FENTANYL CITRATE 50 MCG: 50 INJECTION INTRAMUSCULAR; INTRAVENOUS at 10:02

## 2024-02-19 RX ADMIN — FENTANYL CITRATE 25 MCG: 50 INJECTION INTRAMUSCULAR; INTRAVENOUS at 03:02

## 2024-02-19 RX ADMIN — ROCURONIUM BROMIDE 20 MG: 10 INJECTION INTRAVENOUS at 11:02

## 2024-02-19 RX ADMIN — Medication 10 MG: at 10:02

## 2024-02-19 RX ADMIN — ROCURONIUM BROMIDE 50 MG: 10 INJECTION INTRAVENOUS at 07:02

## 2024-02-19 RX ADMIN — ACETAMINOPHEN 1000 MG: 10 INJECTION, SOLUTION INTRAVENOUS at 01:02

## 2024-02-19 RX ADMIN — ROCURONIUM BROMIDE 20 MG: 10 INJECTION INTRAVENOUS at 12:02

## 2024-02-19 RX ADMIN — MORPHINE SULFATE 2 MG: 2 INJECTION, SOLUTION INTRAMUSCULAR; INTRAVENOUS at 10:02

## 2024-02-19 RX ADMIN — CEFAZOLIN 3 G: 330 INJECTION, POWDER, FOR SOLUTION INTRAMUSCULAR; INTRAVENOUS at 08:02

## 2024-02-19 RX ADMIN — Medication 20 MG: at 08:02

## 2024-02-19 RX ADMIN — LABETALOL HYDROCHLORIDE 10 MG: 5 INJECTION, SOLUTION INTRAVENOUS at 09:02

## 2024-02-19 RX ADMIN — LABETALOL HYDROCHLORIDE 5 MG: 5 INJECTION, SOLUTION INTRAVENOUS at 09:02

## 2024-02-19 RX ADMIN — ONDANSETRON 4 MG: 2 INJECTION INTRAMUSCULAR; INTRAVENOUS at 01:02

## 2024-02-19 RX ADMIN — ONDANSETRON HYDROCHLORIDE 8 MG: 4 TABLET, FILM COATED ORAL at 10:02

## 2024-02-19 RX ADMIN — DEXMEDETOMIDINE 8 MCG: 100 INJECTION, SOLUTION, CONCENTRATE INTRAVENOUS at 01:02

## 2024-02-19 RX ADMIN — METHOCARBAMOL 750 MG: 750 TABLET ORAL at 03:02

## 2024-02-19 RX ADMIN — Medication 10 MG: at 09:02

## 2024-02-19 RX ADMIN — MIDAZOLAM 2 MG: 1 INJECTION INTRAMUSCULAR; INTRAVENOUS at 07:02

## 2024-02-19 RX ADMIN — ROCURONIUM BROMIDE 20 MG: 10 INJECTION INTRAVENOUS at 10:02

## 2024-02-19 RX ADMIN — SENNOSIDES AND DOCUSATE SODIUM 1 TABLET: 8.6; 5 TABLET ORAL at 03:02

## 2024-02-19 RX ADMIN — ROCURONIUM BROMIDE 30 MG: 10 INJECTION INTRAVENOUS at 09:02

## 2024-02-19 RX ADMIN — ROPIVACAINE HYDROCHLORIDE 20 ML: 5 INJECTION, SOLUTION EPIDURAL; INFILTRATION; PERINEURAL at 07:02

## 2024-02-19 RX ADMIN — MUPIROCIN: 20 OINTMENT TOPICAL at 06:02

## 2024-02-19 RX ADMIN — SODIUM CHLORIDE, SODIUM GLUCONATE, SODIUM ACETATE, POTASSIUM CHLORIDE, MAGNESIUM CHLORIDE, SODIUM PHOSPHATE, DIBASIC, AND POTASSIUM PHOSPHATE: .53; .5; .37; .037; .03; .012; .00082 INJECTION, SOLUTION INTRAVENOUS at 07:02

## 2024-02-19 RX ADMIN — SCOPALAMINE 1 PATCH: 1 PATCH, EXTENDED RELEASE TRANSDERMAL at 06:02

## 2024-02-19 RX ADMIN — FENTANYL CITRATE 100 MCG: 50 INJECTION INTRAMUSCULAR; INTRAVENOUS at 07:02

## 2024-02-19 RX ADMIN — LIDOCAINE HYDROCHLORIDE 100 MG: 20 INJECTION, SOLUTION EPIDURAL; INFILTRATION; INTRACAUDAL at 07:02

## 2024-02-19 RX ADMIN — DEXAMETHASONE SODIUM PHOSPHATE 4 MG: 4 INJECTION INTRA-ARTICULAR; INTRALESIONAL; INTRAMUSCULAR; INTRAVENOUS; SOFT TISSUE at 07:02

## 2024-02-19 RX ADMIN — Medication 6 MG: at 08:02

## 2024-02-19 RX ADMIN — TRAZODONE HYDROCHLORIDE 50 MG: 50 TABLET ORAL at 08:02

## 2024-02-19 RX ADMIN — SUGAMMADEX 200 MG: 100 INJECTION, SOLUTION INTRAVENOUS at 02:02

## 2024-02-19 RX ADMIN — PROPOFOL 150 MG: 10 INJECTION, EMULSION INTRAVENOUS at 07:02

## 2024-02-19 RX ADMIN — HALOPERIDOL LACTATE 0.5 MG: 5 INJECTION, SOLUTION INTRAMUSCULAR at 03:02

## 2024-02-19 RX ADMIN — LIDOCAINE HYDROCHLORIDE 2 MG: 10 INJECTION, SOLUTION EPIDURAL; INFILTRATION; INTRACAUDAL; PERINEURAL at 06:02

## 2024-02-19 RX ADMIN — SODIUM CHLORIDE: 9 INJECTION, SOLUTION INTRAVENOUS at 08:02

## 2024-02-19 RX ADMIN — ASPIRIN 81 MG: 81 TABLET, COATED ORAL at 08:02

## 2024-02-19 RX ADMIN — PIPERACILLIN SODIUM AND TAZOBACTAM SODIUM 4.5 G: 4; .5 INJECTION, POWDER, FOR SOLUTION INTRAVENOUS at 10:02

## 2024-02-19 RX ADMIN — DULOXETINE HYDROCHLORIDE 30 MG: 30 CAPSULE, DELAYED RELEASE ORAL at 08:02

## 2024-02-19 RX ADMIN — TRAMADOL HYDROCHLORIDE 50 MG: 50 TABLET, COATED ORAL at 10:02

## 2024-02-19 RX ADMIN — SODIUM CHLORIDE: 9 INJECTION, SOLUTION INTRAVENOUS at 06:02

## 2024-02-19 RX ADMIN — TRANEXAMIC ACID 1000 MG: 100 INJECTION, SOLUTION INTRAVENOUS at 12:02

## 2024-02-19 RX ADMIN — FENTANYL CITRATE 50 MCG: 50 INJECTION INTRAMUSCULAR; INTRAVENOUS at 02:02

## 2024-02-19 NOTE — NURSING TRANSFER
Nursing Transfer Note      2/19/2024   4:09 PM    Nurse giving handoff:Al DURBIN Rn  Nurse receiving handoff: Mary Johnson    Reason patient is being transferred: postop    Transfer To: [id number]    Transfer via bed    Transfer with n/a    Transported by transport    Transfer Vital Signs:  Blood Pressure:  Heart Rate:  O2:  Temperature:  Respirations:    Telemetry: n/a  Order for Tele Monitor? N/a    Additional Lines: Celestin Catheter        Medicines sent: n/a    Any special needs or follow-up needed: n/a    Patient belongings transferred with patient:  n/a    Chart send with patient: Yes    Notified: spouse    Patient reassessed at: 2/19/24  1  Upon arrival to floor: bed in lowest position

## 2024-02-19 NOTE — PROGRESS NOTES
Pre-procedure complete. Awaiting consents and for pt to provide urine sample before regional block can start.

## 2024-02-19 NOTE — OP NOTE
OPERATIVE NOTE    DATE OF PROCEDURE:  02/19/2024    PREOPERATIVE DIAGNOSIS:   Right tibial plateau fracture, bicondylar, closed, displaced, subsequent encounter with nonunion   Painful prominent hardware right tibia    POSTOPERATIVE DIAGNOSIS:   Right tibial plateau fracture, bicondylar, closed, displaced, subsequent encounter with nonunion   Painful prominent hardware right tibia    PROCEDURE:   Repair nonunion right proximal tibial shaft with autograft obtained from right tibia  Removal deep hardware right proximal tibia  Local fasciocutaneous flap right leg    SURGEON:   Nicho Balderas MD    ASSISTANT:    Stevo Hernandez MD    ANESTHESIA:   General + regional    EBL:    700 mL    COMPLICATIONS:  None    IMPLANTS:   [place]  Variax 2  3.5 mm cortical screw, x3  T2 alpha tibial nail, 9 mm x 345 mm, antibiotic cement coated  Simplex bone cement x 2 + vancomycin 4g + tobramycin 4.8g + methylene blue  5.0 mm proximal interlocking screws, x3  5.0 mm distal interlocking screws, x2    Autograft bone obtained via DINAH from R tibia 30mL  Allograft bone chips, 15mL  Vitoss bone graft substitute, 5mL    Cellerate activated collagen, 1g    Vancomycin 1g  Tobramycin 1.2g    SPECIMENS:   Culture x 5    INDICATIONS FOR PROCEDURE:  48-year-old female, fall from step ladder 10/07/2023   Right bicondylar tibial plateau fracture   Treated at outside hospital via anterior midline knee incision and dual plating.     Now has continued gross motion at fracture site as well as nonunion on x-ray.  History of wound problems requiring wound care and antibiotics.      Lives at home with    Previously independent community ambulator   Self-employed as a realtor   Denies diabetes, heart attack, stroke, blood clot, cancer  Denies tobacco use     I have concern that this represents an infected nonunion.  Regardless there is significant nonunion with gross motion at the fracture site.       I do not feel that this would heal with non  operative management due to this persistent gross motion.     Counseled patient on diagnosis      Discussed non operative management   This would consist of splinting, bracing.  I have concern that this would not be successful due to concern for infection and significant motion at the fracture site.     Discussed operative intervention   Due to concern for her infection in the setting of unstable hardware and gross fracture motion I would recommend the following   Worked through the prior anterior knee incision and remove all the hardware.  Debride any bone that appears to be devitalized or infected.  Take multiple cultures.  Hopefully we can then realign the fracture, and perform a antibiotic coated nail for control the metaphyseal fracture zone.  If there is gross infection throughout this whole area, we may have to perform temporizing measure with an external fixator.  We may be later required to convert to a ringed external fixator depending on soft tissue quality fracture characteristics, bone loss, etc..  Due to prior wound issues, anterior knee incision, the wound may not be closable, where she may have further wound complications.  With this in mind, she may require assistance from Plastic surgery to close these wounds.     I discussed that regardless of the future treatment, given the unstable high energy type fracture prior wound complications, she is at risk for loss of limb, and above-knee amputation.  My main focus is decreasing the risk of this complication.  Hopefully with operative intervention, removing all the hardware, hopefully decreasing nidus for infection, improving the alignment, and the stability, we could both eradicate the infection and cause bony healing, improving her early and long-term function of this lower extremity.     As an alternative surgical procedure I also discussed keeping this current hardware in place, supplemented it with additional fixation, however given my concern for  possible deep infection with grossly unstable fixation, I feel that this treatment would both compromise my goal of removing any and all infection, as well as improving stability, as the current hardware that is in place limits the possibility of getting robust enough fixation without its removal    The risks, benefits, and alternatives to surgery were discussed with the patient and/or family.    Specific risks discussed included, but were not limited to: need for flap coverage, wound complications damage to nearby structures, including neurovascular structures leading to loss of function or loss of limb, bleeding, need for blood transfusion, pain, stiffness, scarring, numbness, tingling, weakness, compartment syndrome, malunion/nonunion, hardware failure, hardware prominence, infection, need for multiple staged procedures, prolonged antibiotics, iatrogenic fracture, heterotopic ossification, arthritis, a variety of medical complications including but not limited to heart attack, stroke, deep venous thrombosis, pulmonary embolism, prolonged hospitalization, prolonged intubation, and death.   Patient and/or family expressed an understanding and desires to proceed with surgery.   All questions were answered.  No guarantees were implied or stated.  Informed consent was obtained.    Plan remove hardware. Address nonunion. Place antibiotic coated nail, additional fixation as needed. Plastic surgery is available for gastroc flap as needed.      OPERATIVE PROCEDURE:  Patient met in the preoperative hold area and the correct site and side of surgery being the right lower extremity were marked and verified.  Patient brought back to the operative suite.  Regional block placed by anesthesia.  General anesthesia smoothly induced.  Patient transferred over to operative table.  Placed in supine position. All bony prominences were appropriately padded.  Patient received 3 g Ancef for preoperative antibiotics.  The right lower  extremity was then prepped and draped in normal sterile fashion.    Time-out was performed verifying the correct patient, site/side of surgery, surgical consent, radiographs as applicable, preop antibiotics, necessary equipment, anticipated blood loss, length of procedure, postoperative disposition.      We planned for reopening the previous midline incision, developing full-thickness medial and lateral flaps, taking out the hardware, dressing the nonunion, taking multiple cultures.  We would then assess ability to correct the alignment and placed an antibiotic nail.      Esmarch was utilized, tourniquet inflated at 300 mmHg for approximately 130 minutes during the case.    We utilized the prior midline incision.  Skin incised with scalpel.  Hemostasis achieved as needed with electrocautery.  We developed full-thickness skin flaps both medially and laterally.  We used meticulous soft tissue technique.  There was no purulence encountered.      Our attention to hardware removal.  Along the tibial tubercle fracture spike where there was clearly gross motion, this was nearly subcutaneous, and white early in the dissection we encountered 3 screws, and this bone.  The screws were then removed the screwdriver.      We then turned our attention to the medial plate.  We localize the plate through medial aspect of our flap.  We incised directly over plate.  Dissected proximally and distally enough to visualize the entire plate.  The screws were removed.  The plate was then removed.      We then turned our attention to removal of the independent screws.  We localize the under fluoroscopy.  There were multiple screws in anterior posterior direction.  The 3 screws were localized, we sharply dissected down to the screws, and removed.  Fluoroscopy confirmed screw removal.    We then dissected laterally, identified the proximal lateral plate.  We incised the fascia directly over the plate, elevated this along the course of the  plate entering the anterior compartment musculature.  We removed the proximal row screws.  Into the distal aspect of the incision and remove the far cortical screws.      Fluoroscopy confirmed appropriate screw removal.  There was no noted purulence throughout this part of the case.      We then turned our attention to debridement of the tibial nonunion.  There was a metaphyseal void just posterior of the tibial tubercle.  This was booked open, and nonunion, debris, scar tissue was excisionally debrided with a scalpel, rongeur.  We cleaned out the cortical segment as well.  Multiple cultures were sent.  We removed some of tip of the tibial tubercle segment with a rongeur to allow improved reduction.      We then reduced the fracture utilizing pointed reduction clamp, ball spike pushers, manual reduction maneuvers.  In order to hold the reduction of the displaced tibial tubercle segment, we utilized 3.5 mm screw and placed 2 screws from anterior to posterior.    We then turned our attention to intramedullary nail placement for treatment of the tibial nonunion.  We had our exposure for the suprapatellar start point given that we had a long anterior midline incision.  We split the quad tendon.  We inserted the soft tissue guide and the starting wire in appropriate position based on fluoroscopy.  Opening Reamer was utilized.      As the opening Reamer contacted 1 of our anterior to posterior cortical screws, we chose to place a 3rd screw from anterior to posterior, slightly more medial, out of the way of our planned Reamer and later nail case the other screw fixation was jeopardized later during the case.  Utilized an anterior midline incision we placed a screw from anterior to posterior under fluoroscopic guidance.  Set good purchase.      We then placed a ball-tipped guidewire, guided across the fracture site into the distal segment, near the physeal scar distally.  We then sequentially reamed with the Reamer   aspirator up to a size 13 as we are planning for an antibiotic cement coated 9 mm nail.  Of note the patient canal was small, approximately 10 mm, and we are required to over ream to allow appropriate antibiotic cement coated on the nail.  We saved this autograft of intramedullary reamings for treatment of the nonunion as autograft were placed in the metaphyseal void later in the case.    Tourniquet was let down at approximately 130 minutes during the case.  Hemostasis achieved as needed with direct pressure and electrocautery.    We then turned our attention to manufacturing of the antibiotic coated cement nail.  We chose a 9 mm nail of appropriate length.  We used a half-inch cardiac tube, Simplex cement x2, vancomycin 4 g, tobramycin 4.8 g, methylene blue.  The appropriate size nail was inserted to the cardiac tubing, cement was injected, allowed to harden, tube was cut away.    This antibiotic cement coated nail was then inserted through our suprapatellar portal without a guidewire, we guided across the fracture and seated appropriately into the tibial shaft and distal tibia.  To the patient's thin bone, and thickened antibiotic cement coating, there was some cortical breaks along shaft, but these were all proximal to our planned distal fixation and did not affect the progression of the case or operative plan.    We then turned our attention to placement of proximal interlocking screws.  We used outrigger guide, cannula system.  We placed 2 oblique screws, and 1 medial to lateral screws through the nail.    We then turned our attention to placement of distal interlocking screws.  We used perfect [place] technique.  We used percutaneous stab incision.  Placed 2 screws from medial to lateral through the nail.  Fluoroscopy confirmed appropriate screw placement.    We then reassessed fracture reduction, hardware placement both visually and on fluoroscopic imaging were satisfied.      We obtained the  autograft from the intramedullary reamings, mixed this with 15 mL of allograft bone chips, as well as 5 mL of Vitoss bone graft substitute.  This was mixed, and placed into the metaphyseal void/nonunion site proximal 3rd tibia.    Wounds irrigated with saline.  Hemostasis achieved as needed with electrocautery.      Given her prior surgery, scar tissue, we need to further mobilize both the deep fascia, and the subcutaneous layers in order to obtain closure.  We further mobilized both medially and laterally using Bovie electrocautery, elevating full-thickness fasciocutaneous flaps medially and laterally.  This allowed us appropriate tissue excursion for closure.    1 g vancomycin and 1.2 g tobramycin placed deep.  Fascia closed with 1 Vicryl.  Deep tissue closed with 0 Vicryl.  Subcutaneous tissue closed with 2-0 Vicryl.  Skin closed with staples.  Prevena incisional wound VAC dressing applied.  Cast padding, Ace wrap, knee immobilizer    At the conclusion of procedure the patient had soft and compressible compartments, brisk cap refill, palpable DP/PT pulse in the operative extremity.    Prior to final closure all counts were confirmed to be correct.  Patient tolerated the procedure well without any complications, was awoken from anesthesia, transferred PACU for further recovery.    POSTOPERATIVE PLAN:  48-year-old female, fall from step ladder 10/07/2023   Right bicondylar tibial plateau fracture   Treated at outside hospital via anterior midline knee incision and dual plating, wound issues postop  +Nonunion.    2.19.24 - removal hardware R prox tib, nonunion repair/abx IMN R tibia, complex closure    Abx  IV abx x  48 until prelim cxs neg, consult ID if cxs +  2 wks doxycycline postop  Adjust prn based on cx results    ASA 81mg BID x 6 wks for DVT prophylaxis  50% WB RLE x6 wks postop  Knee immobilizer x 2 wks postop for soft tissue rest, ROMAT thereafter    Ca, Vit D  Bone stim    X-rays at subsequent  followups:  R knee  R tibia    Follow-up postop  1 week - remove WV  2-3 weeks - staple removal, remove knee immobilizer, XR  6 weeks, 3 months, 6 months, 1 year    =====================  Nicho Balderas MD  Orthopaedic Surgery

## 2024-02-19 NOTE — PROGRESS NOTES
Dr. Hernandez at bedside. Informed surgical consent and orders are needed. MD also aware that pt hit operative leg this am, resulting in a skin tear. MD states Dr. Balderas will assess.

## 2024-02-19 NOTE — ANESTHESIA PROCEDURE NOTES
Peripheral Block    Patient location during procedure: OR   Block not for primary anesthetic.  Reason for block: at surgeon's request and post-op pain management   Post-op Pain Location: Right Leg Pain   Start time: 2/19/2024 7:38 AM  Timeout: 2/19/2024 7:37 AM   End time: 2/19/2024 7:46 AM    Staffing  Authorizing Provider: Yo Gomez MD  Performing Provider: Gabriel Ashford DO    Staffing  Performed by: Gabriel Ashford DO  Authorized by: Yo Gomez MD    Preanesthetic Checklist  Completed: patient identified, IV checked, site marked, risks and benefits discussed, surgical consent, monitors and equipment checked, pre-op evaluation and timeout performed  Peripheral Block  Patient position: supine  Prep: ChloraPrep  Patient monitoring: heart rate, cardiac monitor, continuous pulse ox, continuous capnometry and frequent blood pressure checks  Block type: adductor canal  Laterality: right  Injection technique: single shot  Needle  Needle type: Echogenic   Needle gauge: 21 G  Needle length: 4 in  Needle localization: anatomical landmarks and ultrasound guidance   -ultrasound image captured on disc.  Assessment  Injection assessment: negative aspiration, negative parasthesia and local visualized surrounding nerve  Paresthesia pain: none  Heart rate change: no  Slow fractionated injection: yes    Medications:    Medications: ropivacaine (NAROPIN) injection 0.5% - Perineural   20 mL - 2/19/2024 7:45:00 AM    Additional Notes  VSS.  [facility] RN monitoring vitals throughout procedure.  Patient tolerated procedure well.  Time out conducted. Site ethan confirmed with team and patient. Allergies reviewed.   Vital signs stable throughout block. RN monitoring vitals throughout.   Needle advanced under continuous ultrasound guidance.  Local injected incrementally after confirming negative aspiration. No signs or symptoms of intravascular or intraneural injection noted.   No persistent paresthesias elicited or  expressed. Patient tolerated procedure well.  0.25% ropivicaine with epinephrine 1:300K, PF dexamethasone 1 mg, and clonidine 50 mcg used for the block.

## 2024-02-19 NOTE — ANESTHESIA PROCEDURE NOTES
Peripheral Block    Patient location during procedure: OR   Block not for primary anesthetic.  Reason for block: at surgeon's request and post-op pain management   Post-op Pain Location: Right Leg Pain   Start time: 2/19/2024 7:38 AM  Timeout: 2/19/2024 7:37 AM   End time: 2/19/2024 7:46 AM    Staffing  Authorizing Provider: Yo Gomez MD  Performing Provider: Gabriel Ashford DO    Staffing  Performed by: Gabriel Ashford DO  Authorized by: Yo Gomez MD    Preanesthetic Checklist  Completed: patient identified, IV checked, site marked, risks and benefits discussed, surgical consent, monitors and equipment checked, pre-op evaluation and timeout performed  Peripheral Block  Patient position: supine  Prep: ChloraPrep  Patient monitoring: heart rate, cardiac monitor, continuous pulse ox, continuous capnometry and frequent blood pressure checks  Block type: popliteal  Laterality: right  Injection technique: single shot  Needle  Needle type: Echogenic   Needle gauge: 21 G  Needle length: 4 in  Needle localization: anatomical landmarks and ultrasound guidance   -ultrasound image captured on disc.  Assessment  Injection assessment: negative aspiration, negative parasthesia and local visualized surrounding nerve  Paresthesia pain: none  Heart rate change: no  Slow fractionated injection: yes    Medications:    Medications: ropivacaine (NAROPIN) injection 0.5% - Perineural   30 mL - 2/19/2024 7:43:00 AM    Additional Notes  VSS.  [facility] RN monitoring vitals throughout procedure.  Patient tolerated procedure well.  Time out conducted. Site ethan confirmed with team and patient. Allergies reviewed.   Vital signs stable throughout block. RN monitoring vitals throughout.   Needle advanced under continuous ultrasound guidance.  Local injected incrementally after confirming negative aspiration. No signs or symptoms of intravascular or intraneural injection noted.   No persistent paresthesias elicited or  expressed. Patient tolerated procedure well.  30 ml of 0.5 % ropivacaine with epinephrine 1:300K used for the block.

## 2024-02-19 NOTE — ANESTHESIA PROCEDURE NOTES
Intubation    Date/Time: 2/19/2024 7:36 AM    Performed by: Kasey Ritchie CRNA  Authorized by: Perlita Sellres MD    Intubation:     Induction:  Intravenous    Intubated:  Postinduction    Mask Ventilation:  Easy mask    Attempts:  1    Attempted By:  CRNA    Method of Intubation:  Video laryngoscopy    Blade:  Montilla 3    Laryngeal View Grade: Grade I - full view of cords      Difficult Airway Encountered?: No      Complications:  None    Airway Device:  Oral endotracheal tube    Airway Device Size:  7.5    Style/Cuff Inflation:  Cuffed    Inflation Amount (mL):  8    Tube secured:  22    Secured at:  The lips    Placement Verified By:  Capnometry    Complicating Factors:  None    Findings Post-Intubation:  BS equal bilateral and atraumatic/condition of teeth unchanged

## 2024-02-19 NOTE — TRANSFER OF CARE
"Anesthesia Transfer of Care Note    Patient: Neeta Hart    Procedure(s) Performed: Procedure(s) (LRB):  REPAIR,FRACTURE NONUNION,TIBIA PLATEAU - diving board, supine, bone foam. Synthes removal. Synthes DINAH w/ graft capture. Bharathi tibia nail (9mm). 1/2 inch cardia tubing, simplex cement x2. Vanc x4, tobra x4, methylene blue. Pulse lavage, Bactisure, betadine, peroxide. Have available: bharathi ex fix, bharathi plateau plates, screw removal. Possible plastics gastroc flap (Right)  REMOVAL, HARDWARE, LOWER EXTREMITY (Right)    Patient location: PACU    Anesthesia Type: general    Transport from OR: Transported from OR on 6-10 L/min O2 by face mask with adequate spontaneous ventilation    Post pain: adequate analgesia    Post assessment: no apparent anesthetic complications    Post vital signs: stable    Level of consciousness: awake and alert    Nausea/Vomiting: no nausea/vomiting    Complications: none    Transfer of care protocol was followed      Last vitals: Visit Vitals  BP (!) 125/58   Pulse 81   Temp 36.6 °C (97.9 °F) (Temporal)   Resp 18   Ht 5' 7" (1.702 m)   Wt 124.7 kg (275 lb)   LMP 01/10/2024   SpO2 96%   Breastfeeding No   BMI 43.07 kg/m²     "

## 2024-02-20 PROBLEM — E66.01 SEVERE OBESITY (BMI >= 40): Status: ACTIVE | Noted: 2024-02-20

## 2024-02-20 PROBLEM — I10 HYPERTENSION: Status: ACTIVE | Noted: 2024-02-20

## 2024-02-20 LAB
25(OH)D3+25(OH)D2 SERPL-MCNC: 45 NG/ML (ref 30–96)
ALBUMIN SERPL BCP-MCNC: 3.3 G/DL (ref 3.5–5.2)
ALP SERPL-CCNC: 96 U/L (ref 55–135)
ALT SERPL W/O P-5'-P-CCNC: 22 U/L (ref 10–44)
ANION GAP SERPL CALC-SCNC: 6 MMOL/L (ref 8–16)
AST SERPL-CCNC: 21 U/L (ref 10–40)
BILIRUB SERPL-MCNC: 0.3 MG/DL (ref 0.1–1)
BUN SERPL-MCNC: 14 MG/DL (ref 6–20)
CALCIUM SERPL-MCNC: 9.3 MG/DL (ref 8.7–10.5)
CHLORIDE SERPL-SCNC: 107 MMOL/L (ref 95–110)
CO2 SERPL-SCNC: 25 MMOL/L (ref 23–29)
CREAT SERPL-MCNC: 1 MG/DL (ref 0.5–1.4)
CRP SERPL-MCNC: 39.5 MG/L (ref 0–8.2)
ERYTHROCYTE [DISTWIDTH] IN BLOOD BY AUTOMATED COUNT: 15.4 % (ref 11.5–14.5)
EST. GFR  (NO RACE VARIABLE): >60 ML/MIN/1.73 M^2
GLUCOSE SERPL-MCNC: 125 MG/DL (ref 70–110)
HCT VFR BLD AUTO: 34.9 % (ref 37–48.5)
HGB BLD-MCNC: 11.2 G/DL (ref 12–16)
MCH RBC QN AUTO: 28.9 PG (ref 27–31)
MCHC RBC AUTO-ENTMCNC: 32.1 G/DL (ref 32–36)
MCV RBC AUTO: 90 FL (ref 82–98)
PLATELET # BLD AUTO: 286 K/UL (ref 150–450)
PMV BLD AUTO: 9.3 FL (ref 9.2–12.9)
POTASSIUM SERPL-SCNC: 3.9 MMOL/L (ref 3.5–5.1)
PROT SERPL-MCNC: 6.3 G/DL (ref 6–8.4)
RBC # BLD AUTO: 3.88 M/UL (ref 4–5.4)
SODIUM SERPL-SCNC: 138 MMOL/L (ref 136–145)
WBC # BLD AUTO: 10.44 K/UL (ref 3.9–12.7)

## 2024-02-20 PROCEDURE — 97161 PT EVAL LOW COMPLEX 20 MIN: CPT

## 2024-02-20 PROCEDURE — 25000003 PHARM REV CODE 250: Performed by: STUDENT IN AN ORGANIZED HEALTH CARE EDUCATION/TRAINING PROGRAM

## 2024-02-20 PROCEDURE — 25000003 PHARM REV CODE 250: Performed by: ORTHOPAEDIC SURGERY

## 2024-02-20 PROCEDURE — 97112 NEUROMUSCULAR REEDUCATION: CPT

## 2024-02-20 PROCEDURE — 63600175 PHARM REV CODE 636 W HCPCS

## 2024-02-20 PROCEDURE — 63600175 PHARM REV CODE 636 W HCPCS: Performed by: ORTHOPAEDIC SURGERY

## 2024-02-20 PROCEDURE — 85027 COMPLETE CBC AUTOMATED: CPT | Performed by: ORTHOPAEDIC SURGERY

## 2024-02-20 PROCEDURE — 36415 COLL VENOUS BLD VENIPUNCTURE: CPT | Performed by: ORTHOPAEDIC SURGERY

## 2024-02-20 PROCEDURE — 25000003 PHARM REV CODE 250

## 2024-02-20 PROCEDURE — 63600175 PHARM REV CODE 636 W HCPCS: Performed by: STUDENT IN AN ORGANIZED HEALTH CARE EDUCATION/TRAINING PROGRAM

## 2024-02-20 PROCEDURE — 97530 THERAPEUTIC ACTIVITIES: CPT

## 2024-02-20 PROCEDURE — 97535 SELF CARE MNGMENT TRAINING: CPT

## 2024-02-20 PROCEDURE — 11000001 HC ACUTE MED/SURG PRIVATE ROOM

## 2024-02-20 PROCEDURE — 86140 C-REACTIVE PROTEIN: CPT | Performed by: ORTHOPAEDIC SURGERY

## 2024-02-20 PROCEDURE — 97165 OT EVAL LOW COMPLEX 30 MIN: CPT

## 2024-02-20 PROCEDURE — 80053 COMPREHEN METABOLIC PANEL: CPT | Performed by: ORTHOPAEDIC SURGERY

## 2024-02-20 PROCEDURE — 82306 VITAMIN D 25 HYDROXY: CPT | Performed by: ORTHOPAEDIC SURGERY

## 2024-02-20 RX ORDER — CLONAZEPAM 1 MG/1
1 TABLET ORAL ONCE
Status: COMPLETED | OUTPATIENT
Start: 2024-02-20 | End: 2024-02-20

## 2024-02-20 RX ORDER — HYDROMORPHONE HYDROCHLORIDE 1 MG/ML
0.5 INJECTION, SOLUTION INTRAMUSCULAR; INTRAVENOUS; SUBCUTANEOUS
Status: DISCONTINUED | OUTPATIENT
Start: 2024-02-20 | End: 2024-02-21 | Stop reason: HOSPADM

## 2024-02-20 RX ORDER — CLONAZEPAM 0.5 MG/1
0.5 TABLET ORAL 2 TIMES DAILY PRN
Status: DISCONTINUED | OUTPATIENT
Start: 2024-02-20 | End: 2024-02-21 | Stop reason: HOSPADM

## 2024-02-20 RX ORDER — FERROUS SULFATE, DRIED 160(50) MG
2 TABLET, EXTENDED RELEASE ORAL 2 TIMES DAILY
Status: DISCONTINUED | OUTPATIENT
Start: 2024-02-20 | End: 2024-02-21 | Stop reason: HOSPADM

## 2024-02-20 RX ADMIN — OXYCODONE HYDROCHLORIDE 10 MG: 10 TABLET ORAL at 01:02

## 2024-02-20 RX ADMIN — TRAMADOL HYDROCHLORIDE 50 MG: 50 TABLET, COATED ORAL at 05:02

## 2024-02-20 RX ADMIN — TRAMADOL HYDROCHLORIDE 50 MG: 50 TABLET, COATED ORAL at 10:02

## 2024-02-20 RX ADMIN — TRAMADOL HYDROCHLORIDE 50 MG: 50 TABLET, COATED ORAL at 04:02

## 2024-02-20 RX ADMIN — Medication 6 MG: at 09:02

## 2024-02-20 RX ADMIN — CLONAZEPAM 1 MG: 1 TABLET ORAL at 01:02

## 2024-02-20 RX ADMIN — MORPHINE SULFATE 2 MG: 2 INJECTION, SOLUTION INTRAMUSCULAR; INTRAVENOUS at 11:02

## 2024-02-20 RX ADMIN — OXYCODONE HYDROCHLORIDE 10 MG: 10 TABLET ORAL at 08:02

## 2024-02-20 RX ADMIN — PIPERACILLIN SODIUM AND TAZOBACTAM SODIUM 4.5 G: 4; .5 INJECTION, POWDER, FOR SOLUTION INTRAVENOUS at 03:02

## 2024-02-20 RX ADMIN — DULOXETINE HYDROCHLORIDE 30 MG: 30 CAPSULE, DELAYED RELEASE ORAL at 09:02

## 2024-02-20 RX ADMIN — VANCOMYCIN HYDROCHLORIDE 1500 MG: 1.5 INJECTION, POWDER, LYOPHILIZED, FOR SOLUTION INTRAVENOUS at 09:02

## 2024-02-20 RX ADMIN — ASPIRIN 81 MG: 81 TABLET, COATED ORAL at 09:02

## 2024-02-20 RX ADMIN — CLONAZEPAM 0.5 MG: 0.5 TABLET ORAL at 03:02

## 2024-02-20 RX ADMIN — HYDROMORPHONE HYDROCHLORIDE 0.5 MG: 0.5 INJECTION, SOLUTION INTRAMUSCULAR; INTRAVENOUS; SUBCUTANEOUS at 06:02

## 2024-02-20 RX ADMIN — METHOCARBAMOL 750 MG: 750 TABLET ORAL at 09:02

## 2024-02-20 RX ADMIN — MORPHINE SULFATE 2 MG: 2 INJECTION, SOLUTION INTRAMUSCULAR; INTRAVENOUS at 03:02

## 2024-02-20 RX ADMIN — Medication 2 TABLET: at 09:02

## 2024-02-20 RX ADMIN — HYDROMORPHONE HYDROCHLORIDE 0.5 MG: 0.5 INJECTION, SOLUTION INTRAMUSCULAR; INTRAVENOUS; SUBCUTANEOUS at 10:02

## 2024-02-20 RX ADMIN — PIPERACILLIN SODIUM AND TAZOBACTAM SODIUM 4.5 G: 4; .5 INJECTION, POWDER, FOR SOLUTION INTRAVENOUS at 09:02

## 2024-02-20 RX ADMIN — MORPHINE SULFATE 2 MG: 2 INJECTION, SOLUTION INTRAMUSCULAR; INTRAVENOUS at 06:02

## 2024-02-20 RX ADMIN — OXYCODONE HYDROCHLORIDE 10 MG: 10 TABLET ORAL at 09:02

## 2024-02-20 RX ADMIN — ACETAMINOPHEN 1000 MG: 500 TABLET ORAL at 05:02

## 2024-02-20 RX ADMIN — ACETAMINOPHEN 1000 MG: 500 TABLET ORAL at 04:02

## 2024-02-20 RX ADMIN — ONDANSETRON HYDROCHLORIDE 8 MG: 4 TABLET, FILM COATED ORAL at 03:02

## 2024-02-20 RX ADMIN — METHOCARBAMOL 750 MG: 750 TABLET ORAL at 03:02

## 2024-02-20 RX ADMIN — TRAZODONE HYDROCHLORIDE 50 MG: 50 TABLET ORAL at 09:02

## 2024-02-20 RX ADMIN — Medication 2 TABLET: at 12:02

## 2024-02-20 RX ADMIN — OXYCODONE HYDROCHLORIDE 10 MG: 10 TABLET ORAL at 05:02

## 2024-02-20 RX ADMIN — ACETAMINOPHEN 1000 MG: 500 TABLET ORAL at 01:02

## 2024-02-20 RX ADMIN — ACETAMINOPHEN 1000 MG: 500 TABLET ORAL at 12:02

## 2024-02-20 RX ADMIN — ATORVASTATIN CALCIUM 40 MG: 40 TABLET, FILM COATED ORAL at 09:02

## 2024-02-20 RX ADMIN — SENNOSIDES AND DOCUSATE SODIUM 1 TABLET: 8.6; 5 TABLET ORAL at 09:02

## 2024-02-20 NOTE — ASSESSMENT & PLAN NOTE
Neeta Hart is a 48 y.o. female with right tibial plateau fracture nonunion s/p HWR and tibial IMN on 2/19      Pain control: MM  PT/OT: 50% WB RLE  DVT PPx: asa 81 bid, SCDs at all times when not ambulating  Abx: vanc/zosyn pending cx  Cx: NGTD  Labs: Hgb 11.2  Celestin: dced    Dispo: plan to dc home tomorrow vs Thursday if cx remain negative

## 2024-02-20 NOTE — PLAN OF CARE
02/20/24 1531   Post-Acute Status   Post-Acute Authorization Placement   Post-Acute Placement Status Referrals Sent   Discharge Plan   Discharge Plan A Rehab     PT/OT recommending High intensity for d/c needs. Inpatient rehab referrals sent to [facility] and [facility] as patient would like to be close to home in [place]. Will continue to follow.    Gely ALARCON  Case Management  [facility]  [phone]

## 2024-02-20 NOTE — PLAN OF CARE
PT evaluation complete - see note for details. POC and goals established.    Problem: Physical Therapy  Goal: Physical Therapy Goal  Description: Goals to be met by: 3/21/24     Patient will increase functional independence with mobility by performin. Supine to sit with Contact Guard Assistance  2. Sit to supine with Contact Guard Assistance  3. Sit to stand transfer with Contact Guard Assistance  4. Bed to chair transfer with Contact Guard Assistance using Rolling Walker  5. Gait  x 50 feet with Contact Guard Assistance using Rolling Walker.   6. Wheelchair propulsion x250 feet with Supervision using bilateral upper extremities    Outcome: Ongoing, Progressing   2024

## 2024-02-20 NOTE — PLAN OF CARE
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 No    Discharge Plan A: Home with family, Home Health         [id number] - [place], [place] - [address]  [address] [place] [address]  Phone: [phone] Fax: [phone]    [facility] - [place], [place] - [address]  [address]  [place] [address]  Phone: [phone] Fax: [phone]      Initial Assessment (most recent)       Adult Discharge Assessment - 02/20/24 1033          Discharge Assessment    Assessment Type Discharge Planning Assessment     Confirmed/corrected address, phone number and insurance Yes     Confirmed Demographics Correct on Facesheet     Source of Information patient     Does patient/caregiver understand observation status Yes     Communicated BONIFACIO with patient/caregiver Yes     People in Home spouse     Do you expect to return to your current living situation? Yes     Do you have help at home or someone to help you manage your care at home? Yes     Who are your caregiver(s) and their phone number(s)? Spouse (Fabián) [phone]     Prior to hospitilization cognitive status: Alert/Oriented     Current cognitive status: Alert/Oriented     Dressing/Bathing Difficulty no     Home Layout Able to live on 1st floor     Equipment Currently Used at Home wheelchair;walker, rolling;shower chair;grab bar     Readmission within 30 days? No     Patient currently being followed by  outpatient case management? No     Do you currently have service(s) that help you manage your care at home? No     Do you take prescription medications? Yes     Do you have prescription coverage? Yes     Do you have any problems affording any of your prescribed medications? No     Is the patient taking medications as prescribed? yes     Who is going to help you get home at discharge? Spouse-Nghia     How do you get to doctors appointments? family or friend will provide     Are you on dialysis? No     Do you take coumadin? No     Discharge Plan A Home with family;Home Health     DME Needed Upon Discharge  none        Physical Activity    On average, how many days per week do you engage in moderate to strenuous exercise (like a brisk walk)? 0 days     On average, how many minutes do you engage in exercise at this level? 0 min        Financial Resource Strain    How hard is it for you to pay for the very basics like food, housing, medical care, and heating? Not hard at all        Housing Stability    In the last 12 months, was there a time when you were not able to pay the mortgage or rent on time? No     In the last 12 months, how many places have you lived? 1     In the last 12 months, was there a time when you did not have a steady place to sleep or slept in a shelter (including now)? No        Food Insecurity    Within the past 12 months, you worried that your food would run out before you got the money to buy more. Never true     Within the past 12 months, the food you bought just didn't last and you didn't have money to get more. Never true        Stress    Do you feel stress - tense, restless, nervous, or anxious, or unable to sleep at night because your mind is troubled all the time - these days? Not at all        Social Connections    In a typical week, how many times do you talk on the phone with family, friends, or neighbors? More than three times a week     How often do you get together with friends or  relatives? More than three times a week     How often do you attend Yarsani or Yarsani services? Never     Do you belong to any clubs or organizations such as Yarsani groups, unions, fraternal or athletic groups, or school groups? No     How often do you attend meetings of the clubs or organizations you belong to? Never     Are you , , , , never , or living with a partner?         Alcohol Use    Q1: How often do you have a drink containing alcohol? Never     Q2: How many drinks containing alcohol do you have on a typical day when you are drinking? Patient does not drink     Q3: How often do you have six or more drinks on one occasion? Never                      Spoke with patient and spouse at bedside to complete d/c planning assessment. Patient lives with spouse in a single story home with no steps to enter. Independent with ADL's. Home DME includes a wheelchair, rolling walker and shower seat. Verified PCP, Pharmacy and health insurance. PT/OT eval pending. Patient reports having HH in the past and request to not have referral sent to [facility] if recommended by team for d/c. Discharge Plan A and Plan B have been determined by review of patient's clinical status, future medical and therapeutic needs, and coverage/benefits for post-acute care in coordination with multidisciplinary team members.      Gely Marcano RNCM  Case Management  [facility]  [phone]

## 2024-02-20 NOTE — PLAN OF CARE
Problem: Adult Inpatient Plan of Care  Goal: Plan of Care Review  Outcome: Ongoing, Progressing  Goal: Patient-Specific Goal (Individualized)  Outcome: Ongoing, Progressing  Goal: Absence of Hospital-Acquired Illness or Injury  Outcome: Ongoing, Progressing  Goal: Optimal Comfort and Wellbeing  Outcome: Ongoing, Progressing  Goal: Readiness for Transition of Care  Outcome: Ongoing, Progressing     Problem: Bariatric Environmental Safety  Goal: Safety Maintained with Care  Outcome: Ongoing, Progressing     Problem: Infection  Goal: Absence of Infection Signs and Symptoms  Outcome: Ongoing, Progressing     Problem: Fall Injury Risk  Goal: Absence of Fall and Fall-Related Injury  Outcome: Ongoing, Progressing     VSS, NADN. Wound vac seal intact and suction at 125. Pain and anxiety managed with PRN medication, see MAR. Celestin removed, passed voiding trial. No falls or significant events today. Safety maintained, call light within reach, bed alarm set, bed in lowest and locked position, side rails raised x2. Denies needs at this time.

## 2024-02-20 NOTE — SUBJECTIVE & OBJECTIVE
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 VSS. Reporting R leg pain at this time. Required moderate assist to transfer with PT today. Intra-op cx ngtd.    Review of patient's allergies indicates:  No Known Allergies    Current Facility-Administered Medications   Medication    0.9%  NaCl infusion    acetaminophen tablet 1,000 mg    aspirin EC tablet 81 mg    atorvastatin tablet 40 mg    calcium-vitamin D3 500 mg-5 mcg (200 unit) per tablet 2 tablet    clonazePAM tablet 0.5 mg    DULoxetine DR capsule 30 mg    melatonin tablet 6 mg    methocarbamoL tablet 750 mg    midazolam (VERSED) 1 mg/mL injection 0.5-4 mg    morphine injection 2 mg    ondansetron tablet 8 mg    oxyCODONE immediate release tablet 5 mg    oxyCODONE immediate release tablet Tab 10 mg    piperacillin-tazobactam (ZOSYN) 4.5 g in dextrose 5 % in water (D5W) 100 mL IVPB (MB+)    polyethylene glycol packet 17 g    scopolamine 1.3-1.5 mg (1 mg over 3 days) 1 patch    senna-docusate 8.6-50 mg per tablet 1 tablet    sodium chloride 0.9% flush 10 mL    sodium chloride 0.9% flush 10 mL    traMADoL tablet 50 mg    traZODone tablet 50 mg    vancomycin - pharmacy to dose    vancomycin 1,500 mg in dextrose 5 % (D5W) 250 mL IVPB (Vial-Mate)     Objective:     Vital Signs (Most Recent):  Temp: 97.5 °F (36.4 °C) (02/20/24 1226)  Pulse: 75 (02/20/24 1226)  Resp: 18 (02/20/24 1358)  BP: 129/78 (02/20/24 1226)  SpO2: 98 % (02/20/24 1226) Vital Signs (24h Range):  Temp:  [97.2 °F (36.2 °C)-99.4 °F (37.4 °C)] 97.5 °F (36.4 °C)  Pulse:  [70-92] 75  Resp:  [12-20] 18  SpO2:  [91 %-98 %] 98 %  BP: (102-148)/(55-88) 129/78     Weight: 132.7 kg (292 lb 8.8 oz)  Height: 5' 7" (170.2 cm)  Body mass index is 45.82 kg/m².      Intake/Output Summary (Last 24 hours) at 2/20/2024 1528  Last data filed at 2/20/2024 1305  Gross per 24 hour   Intake 104.12 ml "   Output 2200 ml   Net -2095.88 ml        Ortho/SPM Exam     Awake/alert/oriented x3, No acute distress, Afebrile, Vital signs stable  Good inspiratory effort with unlaboured breathing  Dressings c/d/i  NVI in operative limb     Significant Labs: All pertinent labs within the past 24 hours have been reviewed.    Significant Imaging: I have reviewed all pertinent imaging results/findings.

## 2024-02-20 NOTE — PROGRESS NOTES
[facility] - Surgery  Orthopedics  Progress Note    Patient Name: Neeta Hart  MRN: [id number]  Admission Date: 2/19/2024  Hospital Length of Stay: 1 days  Attending Provider: Nicho Balderas*  Primary Care Provider: Jennifer, Primary Doctor  Follow-up For: Procedure(s) (LRB):  REPAIR,FRACTURE NONUNION,TIBIA PLATEAU - diving board, supine, bone foam. Synthes removal. Synthes DINAH w/ graft capture. [place] tibia nail (9mm). 1/2 inch cardia tubing, simplex cement x2. Vanc x4, tobra x4, methylene blue. Pulse lavage, Bactisure, betadine, peroxide. Have available: clifford ex fix, clifford plateau plates, screw removal. 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 VSS. Reporting R leg pain at this time. Required moderate assist to transfer with PT today. Intra-op cx ngtd.    Review of patient's allergies indicates:  No Known Allergies    Current Facility-Administered Medications   Medication    0.9%  NaCl infusion    acetaminophen tablet 1,000 mg    aspirin EC tablet 81 mg    atorvastatin tablet 40 mg    calcium-vitamin D3 500 mg-5 mcg (200 unit) per tablet 2 tablet    clonazePAM tablet 0.5 mg    DULoxetine DR capsule 30 mg    melatonin tablet 6 mg    methocarbamoL tablet 750 mg    midazolam (VERSED) 1 mg/mL injection 0.5-4 mg    morphine injection 2 mg    ondansetron tablet 8 mg    oxyCODONE immediate release tablet 5 mg    oxyCODONE immediate release tablet Tab 10 mg    piperacillin-tazobactam (ZOSYN) 4.5 g in dextrose 5 % in water (D5W) 100 mL IVPB (MB+)    polyethylene glycol packet 17 g    scopolamine 1.3-1.5 mg (1 mg over 3 days) 1 patch    senna-docusate 8.6-50 mg per tablet 1 tablet    sodium chloride 0.9% flush 10 mL    sodium chloride 0.9% flush 10 mL     "traMADoL tablet 50 mg    traZODone tablet 50 mg    vancomycin - pharmacy to dose    vancomycin 1,500 mg in dextrose 5 % (D5W) 250 mL IVPB (Vial-Mate)     Objective:     Vital Signs (Most Recent):  Temp: 97.5 °F (36.4 °C) (02/20/24 1226)  Pulse: 75 (02/20/24 1226)  Resp: 18 (02/20/24 1358)  BP: 129/78 (02/20/24 1226)  SpO2: 98 % (02/20/24 1226) Vital Signs (24h Range):  Temp:  [97.2 °F (36.2 °C)-99.4 °F (37.4 °C)] 97.5 °F (36.4 °C)  Pulse:  [70-92] 75  Resp:  [12-20] 18  SpO2:  [91 %-98 %] 98 %  BP: (102-148)/(55-88) 129/78     Weight: 132.7 kg (292 lb 8.8 oz)  Height: 5' 7" (170.2 cm)  Body mass index is 45.82 kg/m².      Intake/Output Summary (Last 24 hours) at 2/20/2024 1528  Last data filed at 2/20/2024 1305  Gross per 24 hour   Intake 104.12 ml   Output 2200 ml   Net -2095.88 ml        Ortho/SPM Exam     Awake/alert/oriented x3, No acute distress, Afebrile, Vital signs stable  Good inspiratory effort with unlaboured breathing  Dressings c/d/i  NVI in operative limb     Significant Labs: All pertinent labs within the past 24 hours have been reviewed.    Significant Imaging: I have reviewed all pertinent imaging results/findings.  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 female with right tibial plateau fracture nonunion s/p HWR and tibial IMN on 2/19      Pain control: MM  PT/OT: 50% WB RLE  DVT PPx: asa 81 bid, SCDs at all times when not ambulating  Abx: vanc/zosyn pending cx  Cx: NGTD  Labs: Hgb 11.2  Celestin: dced    Dispo: plan to dc home tomorrow vs Thursday if cx remain negative            Stevo Hernandez MD  Orthopedics  [facility] - Surgery    "

## 2024-02-20 NOTE — PLAN OF CARE
Problem: Occupational Therapy  Goal: Occupational Therapy Goal  Description: Goals to be met by: 03/20/2024     Patient will increase functional independence with ADLs by performing:    UE Dressing with Modified [place].  LE Dressing with Contact Guard Assistance.  Grooming while standing with Contact Guard Assistance.  Toileting from bedside commode with Contact Guard Assistance for hygiene and clothing management.   Supine to sit with Supervision.  Step transfer with Contact Guard Assistance    Outcome: Ongoing, Progressing       OT eval complete & goals established.

## 2024-02-20 NOTE — PROGRESS NOTES
"Pharmacokinetic Initial Assessment: IV Vancomycin    Assessment/Plan:    Initiate intravenous vancomycin with 1000 mg given in surgery, then begin 1500 mg every 12 hours.   Desired empiric serum trough concentration is 15 to 20 mcg/mL  Draw vancomycin trough level 60 min prior to fourth dose on 2/21 at approximately 0800  Pharmacy will continue to follow and monitor vancomycin.      Please contact pharmacy at extension [phone] with any questions regarding this assessment.     Thank you for the consult,   Mary Marx       Patient brief summary:  Neeta Hart is a 48 y.o. female initiated on antimicrobial therapy with IV Vancomycin for treatment of suspected bone/joint infection    Drug Allergies:   Review of patient's allergies indicates:  No Known Allergies    Actual Body Weight:   124.7 kg     Renal Function:   Estimated Creatinine Clearance: 115.3 mL/min (based on SCr of 0.8 mg/dL).,     Dialysis Method (if applicable):  N/A    CBC (last 72 hours):  No results for input(s): "WHITE BLOOD CELL COUNT", "HEMOGLOBIN", "HEMATOCRIT", "PLATELETS", "GRAN%", "LYMPH%", "MONO%", "EOSINOPHIL%", "BASOPHIL%", "DIFFERENTIAL METHOD" in the last 72 hours.    Metabolic Panel (last 72 hours):  Recent Labs   Lab Result Units 02/19/24  1511   Sodium mmol/L 139   Potassium mmol/L 4.1   Chloride mmol/L 108   CO2 mmol/L 18*   Glucose mg/dL 154*   BUN mg/dL 16   Creatinine mg/dL 0.8   Albumin g/dL 3.4*   Total Bilirubin mg/dL 0.2   Alkaline Phosphatase U/L 91   AST U/L 26   ALT U/L 33       Drug levels (last 3 results):  No results for input(s): "VANCOMYCINRA", "VANCORANDOM", "VANCOMYCINPE", "VANCOPEAK", "VANCOMYCINTR", "VANCOTROUGH" in the last 72 hours.    Microbiologic Results:  Microbiology Results (last 7 days)       Procedure Component Value Units Date/Time    Gram stain [[id number]] Collected: 02/19/24 0928    Order Status: Completed Specimen: Wound from Tibia, Right Updated: 02/19/24 1320     Gram Stain Result No WBC's      No " organisms seen    Narrative:      Right Tibia Hardware Culture    Gram stain [[id number]] Collected: 02/19/24 1031    Order Status: Completed Specimen: Wound from Tibia, Right Updated: 02/19/24 1240     Gram Stain Result No WBC's      No organisms seen    Narrative:      Right Tibia Tissue #2 Culture    Gram stain [[id number]] Collected: 02/19/24 1040    Order Status: Completed Specimen: Wound from Tibia, Right Updated: 02/19/24 1238     Gram Stain Result No WBC's      No organisms seen    Narrative:      Right Tibia Tissue #3 Culture    Gram stain [[id number]] Collected: 02/19/24 1045    Order Status: Completed Specimen: Wound from Tibia, Right Updated: 02/19/24 1225     Gram Stain Result No WBC's      No organisms seen    Narrative:      Right Tibia Tissue #4 Culture    Gram stain [[id number]] Collected: 02/19/24 1018    Order Status: Completed Specimen: Wound from Tibia, Right Updated: 02/19/24 1222     Gram Stain Result No WBC's      No organisms seen    Narrative:      Right Tibia Tissue #1 Culture    Aerobic culture [[id number]] Collected: 02/19/24 0928    Order Status: Sent Specimen: Wound from Tibia, Right Updated: 02/19/24 1118    AFB Culture & Smear [[id number]] Collected: 02/19/24 0928    Order Status: Sent Specimen: Wound from Tibia, Right Updated: 02/19/24 1117    Culture, Anaerobe [[id number]] Collected: 02/19/24 0928    Order Status: Sent Specimen: Wound from Tibia, Right Updated: 02/19/24 1116    Fungus culture [[id number]] Collected: 02/19/24 0928    Order Status: Sent Specimen: Wound from Tibia, Right Updated: 02/19/24 1116    Aerobic culture [[id number]] Collected: 02/19/24 1040    Order Status: Sent Specimen: Wound from Tibia, Right Updated: 02/19/24 1105    AFB Culture & Smear [[id number]] Collected: 02/19/24 1031    Order Status: Sent Specimen: Wound from Tibia, Right Updated: 02/19/24 1105    Fungus culture [[id number]] Collected: 02/19/24 1031    Order Status: Sent Specimen: Wound from Tibia,  Right Updated: 02/19/24 1104    Fungus culture [[id number]] Collected: 02/19/24 1040    Order Status: Sent Specimen: Wound from Tibia, Right Updated: 02/19/24 1104    AFB Culture & Smear [[id number]] Collected: 02/19/24 1040    Order Status: Sent Specimen: Wound from Tibia, Right Updated: 02/19/24 1104    Culture, Anaerobe [[id number]] Collected: 02/19/24 1040    Order Status: Sent Specimen: Wound from Tibia, Right Updated: 02/19/24 1103    Aerobic culture [[id number]] Collected: 02/19/24 1031    Order Status: Sent Specimen: Wound from Tibia, Right Updated: 02/19/24 1103    Culture, Anaerobe [[id number]] Collected: 02/19/24 1031    Order Status: Sent Specimen: Wound from Tibia, Right Updated: 02/19/24 1103    Fungus culture [[id number]] Collected: 02/19/24 1045    Order Status: Sent Specimen: Wound from Tibia, Right Updated: 02/19/24 1102    AFB Culture & Smear [[id number]] Collected: 02/19/24 1018    Order Status: Sent Specimen: Wound from Tibia, Right Updated: 02/19/24 1102    Culture, Anaerobe [[id number]] Collected: 02/19/24 1045    Order Status: Sent Specimen: Wound from Tibia, Right Updated: 02/19/24 1102    Culture, Anaerobe [[id number]] Collected: 02/19/24 1018    Order Status: Sent Specimen: Wound from Tibia, Right Updated: 02/19/24 1102    AFB Culture & Smear [[id number]] Collected: 02/19/24 1045    Order Status: Sent Specimen: Wound from Tibia, Right Updated: 02/19/24 1102    Aerobic culture [[id number]] Collected: 02/19/24 1045    Order Status: Sent Specimen: Wound from Tibia, Right Updated: 02/19/24 1101    Fungus culture [[id number]] Collected: 02/19/24 1018    Order Status: Sent Specimen: Wound from Tibia, Right Updated: 02/19/24 1101    Aerobic culture [[id number]] Collected: 02/19/24 1018    Order Status: Sent Specimen: Wound from Tibia, Right Updated: 02/19/24 1101

## 2024-02-20 NOTE — PT/OT/SLP EVAL
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 Synthes removal. Synthes DINAH w/ graft capture. [place] tibia nail (9mm). 1/2 inch cardia tubing, simplex cement x2. Vanc x4, tobra x4, methylene blue. Pulse lavage, Bactisure, betadine, peroxide. Have available: clifford ex fix, clifford plateau plates, screw removal. Possible plastics gastroc flap (Right)  REMOVAL, HARDWARE, LOWER EXTREMITY (Right) 1 Day Post-Op    Recommendations:     Discharge Recommendations: high intensity therapy  Equipment recommendations: bedside commode  Barriers to discharge: Increased level of skilled assistance required and Fall risk     Patient has a mobility limitation that significantly impairs their ability to participate in one or more mobility related activities of daily living, including toileting. This deficit can be resolved by using a bedside commode. Patient demonstrates mobility limitations that will cause them to be confined to one room at home without bathroom access for up to 30 days. Using a bedside commode will greatly improve the patient's ability to participate in MRADLs.     Assessment:     Neeta Hart is a 48 y[place]o. 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 Pt presents with weakness, impaired endurance, impaired self care skills, impaired functional mobility, gait instability, impaired balance, decreased coordination, decreased lower extremity function, decreased safety awareness, pain, impaired coordination, orthopedic precautions. These deficits effect their roles and responsibilities in which they were able to complete prior to admit.     Pt is agreeable to therapy  evaluation and session. PTA, pt has been transferring from bed > w/c and propelling herself with christopher UE. Pt states she uses RW when going to the bathroom as her w/c does not fit. Pt states that she has not truly walked since October when initial fall occurred. Pt is very anxious when discussing mobility; education provided regarding importance of mobility and placing weight through RLE. Pt requires assist with bed mobility. Cushion placed under RLE while sitting eob to assist with supporting it during ADLs. Trialed 1 stand with RW. Pt chose to keep RLE NWB. Able to hop 2 times toward L with assist. Returned to supine at end of session. Will continue to progress as tolerated.    Neeta Hart would benefit from acute PT intervention to improve quality of life, focus on recovery of impairments, provide patient/caregiver education, reduce fall risk, and maximize (I) and safety with functional mobility. Once medically stable, recommending pt discharge to high intensity therapy. Patient presents with good participation and motivation to return to prior level of function with high intensity therapy.  The patient demonstrates appropriate strength to participate in up to 3 hours or 15hrs of combined therapy post acute. .      Rehab Prognosis: Good    Plan:     During this hospitalization, patient to be seen 4 x/week to address the identified rehab impairments via gait training, therapeutic activities, therapeutic exercises, neuromuscular re-education and progress towards stated goals.     Plan of Care Expires:  03/21/24  Plan of Care reviewed with: patient, spouse    This plan of care has been discussed with the patient/caregiver, who was included in its development and is in agreement with the identified goals and treatment plan.     Subjective     Communicated with RN prior to session.  Patient found supine upon PT entry to room, agreeable to evaluation. Pt's  present during session.    Chief Complaint: RLE  "pain    Patient/Family Comments/goals: "I couldn't put one foot in front of the other"    Pain/Comfort:  Pain Rating 1: 8/10  Location - Side 1: Right  Location 1: leg  Pain Addressed 1: Distraction, Reposition, Cessation of Activity  Pain Rating Post-Intervention 1: 10/10    Patients cultural, spiritual, Christianity conflicts given the current situation: None identified     Patient History: information obtained from pt     Living Environment: Pt lives with  in single level home  with 0 SKYLA.  Prior Level of Function: modified (I) for mobility and ADLs using w/c and RW as AD   DME owned: rolling walker, shower chair, and wheelchair  Support Available/Caregiver Assistance:      Objective:   OT present for coeval due to pt's multiple medical comorbidities and functional/cognition deficits requiring two skilled therapists to appropriately progress pt's musculoskeletal strength, neuromuscular control, and endurance while taking into consideration medical acuity and pt safety.    Patient found with: wound vac, PureWick, peripheral IV, FCD    Recent Surgery: Procedure(s) (LRB):  REPAIR,FRACTURE NONUNION,TIBIA PLATEAU - diving board, supine, bone foam. Synthes removal. Synthes DINAH w/ graft capture. [place] tibia nail (9mm). 1/2 inch cardia tubing, simplex cement x2. Vanc x4, tobra x4, methylene blue. Pulse lavage, Bactisure, betadine, peroxide. Have available: clifford ex fix, clifford plateau plates, screw removal. Possible plastics gastroc flap (Right)  REMOVAL, HARDWARE, LOWER EXTREMITY (Right) 1 Day Post-Op  General Precautions: Standard, fall   Orthopedic Precautions: (RLE 50% with KI on all times)   Braces: Knee immobilizer   Oxygen Device: room air      Exams:    Cognition:  Alert  Command following: Follows multistep verbal commands  Communication: clear/fluent    Sensation:   Light touch sensation: Intact BLEs    Gross Motor Coordination: No deficits noted during functional mobility tasks     Edema/Skin " Integrity: None noted; Visible skin intact    Postural examination/scapula alignment: Rounded shoulder and Head forward    Lower Extremity Range of Motion:  Right Lower Extremity:  not assessed d/t KI  Left Lower Extremity: WFL    Lower Extremity Strength:  Right Lower Extremity: WFL PF/DF  Left Lower Extremity: WFL    Functional Mobility:    Bed Mobility:  Scooting with contact guard assistance  Supine > Sit with moderate assistancex2  Sit > Supine with moderate assistance    Transfers:   Sit <> Stand Transfer: Moderate Assistancex2 x 1 trials from eob with RW AD   Pt chose to keep RLE NWB  Bed <> Chair: deferred d/t pt with increased anxiety             Gait:  Distance: 2 hops toward L  Assistance level: Moderate Assistance  Assistive Device: rolling walker  Gait Assessment: decreased step length  and decreased johan    Balance:  Dynamic Sitting: FAIR+: Maintains balance through MINIMAL excursions of active trunk motion  Standing:  Static: POOR: Needs MODERATE assist to maintain   Dynamic: POOR: Needs MOD (moderate) assist during gait    Outcome Measure: AM-PAC 6 CLICK MOBILITY  Total Score:11     Patient/Caregiver Education:     Therapist educated pt/caregiver regarding:   PT POC and goals for therapy   Safety with mobility and fall risk   Instruction on use of call button and importance of calling nursing staff for assistance with mobility   Time provided for therapeutic counseling and discussion of current health disposition. All questions answered to satisfaction, within scope of PT practice     Patient/caregiver able to verbalize understanding and expressed no further questions this visit; will follow-up with pt/caregiver during current admit for additional questions/concerns within scope of practice.     White board updated.     Patient left supine with all lines intact, call button in reach, and nsg notified.    GOALS:   Multidisciplinary Problems       Physical Therapy Goals          Problem: Physical  Therapy    Goal Priority Disciplines Outcome Goal Variances Interventions   Physical Therapy Goal     PT, PT/OT Ongoing, Progressing     Description: Goals to be met by: 3/21/24     Patient will increase functional independence with mobility by performin. Supine to sit with Contact Guard Assistance  2. Sit to supine with Contact Guard Assistance  3. Sit to stand transfer with Contact Guard Assistance  4. Bed to chair transfer with Contact Guard Assistance using Rolling Walker  5. Gait  x 50 feet with Contact Guard Assistance using Rolling Walker.   6. Wheelchair propulsion x250 feet with Supervision using bilateral upper extremities                           History:     Past Medical History:   Diagnosis Date    Hypertension        Past Surgical History:   Procedure Laterality Date    left ankle surgery      REMOVAL OF HARDWARE FROM LOWER EXTREMITY Right 2024    Procedure: REMOVAL, HARDWARE, LOWER EXTREMITY;  Surgeon: Nicho Balderas MD;  Location: [facility] OR [address] [place] [address];  Service: Orthopedics;  Laterality: Right;    REPAIR,FRACTURE NONUNION,TIBIA Right 2024    Procedure: REPAIR,FRACTURE NONUNION,TIBIA PLATEAU - diving board, supine, bone foam. Synthes removal. Synthes DINAH w/ graft capture. [place] tibia nail (9mm). 1/2 inch cardia tubing, simplex cement x2. Vanc x4, tobra x4, methylene blue. Pulse lavage, Bactisure, betadine, peroxide. Have available: bharathi ex fix, bharathi plateau plates, screw removal. Possible plastics gastroc flap;  Surgeon: Cindi    right leg surgery         Time Tracking:     PT Received On: 24  PT Start Time: 1313     PT Stop Time: 1345  PT Total Time (min): 32 min     Billable Minutes: Evaluation 8, Therapeutic Activity 12, and Neuromuscular Re-education 12    2024

## 2024-02-20 NOTE — PT/OT/SLP EVAL
Occupational Therapy   Co-Evaluation  Co-evaluation/treatment performed due to patient's multiple deficits requiring two skilled therapists to appropriately and safely assess patient's strength and endurance while facilitating functional tasks in addition to accommodating for patient's activity tolerance.        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 Synthes removal. Synthes DINAH w/ graft capture. [place] tibia nail (9mm). 1/2 inch cardia tubing, simplex cement x2. Vanc x4, tobra x4, methylene blue. Pulse lavage, Bactisure, betadine, peroxide. Have available: bharathi ex fix, bharathi plateau plates, screw removal. Possible plastics gastroc flap (Right)  REMOVAL, HARDWARE, LOWER EXTREMITY (Right) 1 Day Post-Op    Recommendations:     Discharge Recommendations: High Intensity Therapy  Discharge Equipment Recommendations:  bedside commode  Barriers to discharge:  None    Assessment:     Neeta Hart is a 48 y.o. 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  She presents with the following performance deficits affecting function: weakness, impaired self care skills, impaired functional mobility, impaired balance, decreased lower extremity function, pain, orthopedic precautions. Pt agreeable to therapy and tolerated fairly. Pt required consistent education/motivation to attempt a sit<->stand from EOB. Post patient approval, pt completed sit<->stand with moderate assistance of 2 persons and was able to maintain WB precautions. At this time, pt is limited in ADLs, functional mobility, and functional transfers and is currently not performing tasks at PLOF. Pt would continue to benefit from skilled OT services to maximize functional independence with ADLs and functional mobility, reduce caregiver burden,  and facilitate safe discharge in the least restrictive environment.      Rehab Prognosis: Good; patient would benefit from acute skilled OT services to address these deficits and reach maximum level of function.       Plan:     Patient to be seen 4 x/week to address the above listed problems via self-care/home management, therapeutic activities, therapeutic exercises  Plan of Care Expires: 03/20/24  Plan of Care Reviewed with: patient, spouse    Subjective     Chief Complaint: R LE pain  Patient/Family Comments/goals: pt agreeable to OT    Occupational Profile:  Living Environment: pt lives with  in a [facility], 0 SKYLA. 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  Patient found HOB elevated with wound vac, PureWick, peripheral IV, FCD upon OT entry to room.    General Precautions: Standard, fall  Orthopedic Precautions:  (RLE 50% with KI on all times)  Braces: Knee immobilizer  Respiratory Status: Room air    Occupational Performance:    Bed Mobility:    Patient completed Scooting to EOB with stand by assistance  Patient completed Supine to Sit with moderate assistance and 2 persons  Patient completed Sit to Supine with moderate assistance    Functional Mobility/Transfers:  Patient completed Sit <> Stand Transfer with moderate assistance and of 2 persons  with  rolling walker   Functional Mobility: pt completed 3 hops to the left with moderate assistance    Pt required verbal cueing for hand placement & RW management    Activities of Daily Living:  Grooming: stand by assistance to complete facial & oral hygiene  Upper Body Dressing: minimum assistance to don gown over back  Lower Body Dressing: total assistance to don/dof socks    Cognitive/Visual Perceptual:  Cognitive/Psychosocial Skills:     -       Follows Commands/attention:Follows multistep  commands    Physical Exam:  Upper Extremity Range of Motion:     -       Right Upper Extremity: WFL  -       Left Upper Extremity: WFL  Upper Extremity Strength:    -       Right Upper Extremity: WFL  -       Left Upper Extremity: WFL   Strength:    -       Right Upper Extremity: WFL  -       Left Upper Extremity: WFL  Fine Motor Coordination:    -       Intact  Left hand thumb/finger opposition skills and Right hand thumb/finger opposition skills  Gross motor coordination:   WFL    AMPAC 6 Click ADL:  AMPAC Total Score: 17    Treatment & Education:  -Pt educated on hand placement for transfers  -Pt educated on RW placement during functional transfers/mobility  -Education on task modification to maximize safety and (I) during ADLs and mobility  -Education on importance of OOB activity to improve overall activity tolerance and promote recovery  -Pt educated on weightbearing and surgical precautions with pt verbalizing understanding  -Pt educated to call for assistance and to transfer with hospital staff only  Pt had no further questions & when asked whether there were any concerns pt reported none.       Patient left HOB elevated with all lines intact, call button in reach, RN notified, and  present    GOALS:   Multidisciplinary Problems       Occupational Therapy Goals          Problem: Occupational Therapy    Goal Priority Disciplines Outcome Interventions   Occupational Therapy Goal     OT, PT/OT Ongoing, Progressing    Description: Goals to be met by: 03/20/2024     Patient will increase functional  independence with ADLs by performing:    UE Dressing with Modified [place].  LE Dressing with Contact Guard Assistance.  Grooming while standing with Contact Guard Assistance.  Toileting from bedside commode with Contact Guard Assistance for hygiene and clothing management.   Supine to sit with Supervision.  Step transfer with Contact Guard Assistance                         History:     Past Medical History:   Diagnosis Date    Hypertension          Past Surgical History:   Procedure Laterality Date    left ankle surgery      REMOVAL OF HARDWARE FROM LOWER EXTREMITY Right 2/19/2024    Procedure: REMOVAL, HARDWARE, LOWER EXTREMITY;  Surgeon: Nicho Balderas MD;  Location: [facility] OR [address] [place] [address];  Service: Orthopedics;  Laterality: Right;    REPAIR,FRACTURE NONUNION,TIBIA Right 2/19/2024    Procedure: REPAIR,FRACTURE NONUNION,TIBIA PLATEAU - diving board, supine, bone foam. Synthes removal. Synthes DINAH w/ graft capture. [place] tibia nail (9mm). 1/2 inch cardia tubing, simplex cement x2. Vanc x4, tobra x4, methylene blue. Pulse lavage, Bactisure, betadine, peroxide. Have available: bharathi ex fix, bharathi plateau plates, screw removal. Possible plastics gastroc flap;  Surgeon: Cindi    right leg surgery         Time Tracking:     OT Date of Treatment: 02/20/24  OT Start Time: 1313  OT Stop Time: 1345  OT Total Time (min): 32 min    Billable Minutes:Evaluation 8  Self Care/Home Management 10  Therapeutic Activity 14    2/20/2024

## 2024-02-20 NOTE — ANESTHESIA POSTPROCEDURE EVALUATION
Anesthesia Post Evaluation    Patient: Neeta Hart    Procedure(s) Performed: Procedure(s) (LRB):  REPAIR,FRACTURE NONUNION,TIBIA PLATEAU - diving board, supine, bone foam. Synthes removal. Synthes DINAH w/ graft capture. [place] tibia nail (9mm). 1/2 inch cardia tubing, simplex cement x2. Vanc x4, tobra x4, methylene blue. Pulse lavage, Bactisure, betadine, peroxide. Have available: clifford ex fix, clifford plateau plates, screw removal. Possible plastics gastroc flap (Right)  REMOVAL, HARDWARE, LOWER EXTREMITY (Right)    Final Anesthesia Type: general      Patient location during evaluation: PACU  Patient participation: Yes- Able to Participate  Level of consciousness: awake and alert  Post-procedure vital signs: reviewed and stable  Pain management: adequate  Airway patency: patent    PONV status at discharge: No PONV  Anesthetic complications: no      Cardiovascular status: hemodynamically stable  Respiratory status: spontaneous ventilation  Follow-up not needed.              Vitals Value Taken Time   /70 02/20/24 0700   Temp 36.9 °C (98.5 °F) 02/20/24 0700   Pulse 81 02/20/24 0700   Resp 18 02/20/24 1007   SpO2 95 % 02/20/24 0700         Event Time   Out of Recovery 16:00:00         Pain/Mc Score: Pain Rating Prior to Med Admin: 8 (2/20/2024 10:07 AM)  Pain Rating Post Med Admin: 7 (2/20/2024  6:46 AM)  Mc Score: 9 (2/19/2024  5:30 PM)

## 2024-02-20 NOTE — CARE UPDATE
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       Neeta Napier PA-C  [facility]

## 2024-02-20 NOTE — PLAN OF CARE
Report received  and assumed  care of pt. Assessment as noted. POC reviewed with verbal understanding. Pt medicated around the clock with PRNs. Pt unable to sleep though the night. Minimal to no relief  with pain regimen. Klonopin x1 given  per MAR to help with anxiety. No other acute changes in status.  Will continue to  monitor for changes in status.        Problem: Adult Inpatient Plan of Care  Goal: Plan of Care Review  Outcome: Ongoing, Progressing  Goal: Patient-Specific Goal (Individualized)  Outcome: Ongoing, Progressing  Goal: Absence of Hospital-Acquired Illness or Injury  Outcome: Ongoing, Progressing  Goal: Optimal Comfort and Wellbeing  Outcome: Ongoing, Progressing  Goal: Readiness for Transition of Care  Outcome: Ongoing, Progressing     Problem: Bariatric Environmental Safety  Goal: Safety Maintained with Care  Outcome: Ongoing, Progressing     Problem: Infection  Goal: Absence of Infection Signs and Symptoms  Outcome: Ongoing, Progressing     Problem: Fall Injury Risk  Goal: Absence of Fall and Fall-Related Injury  Outcome: Ongoing, Progressing

## 2024-02-20 NOTE — NURSING
Nurses Note -- 4 Eyes      2/19/2024   6:51 PM      Skin assessed during: Daily Assessment      [x] No Altered Skin Integrity Present    []Prevention Measures Documented      [] Yes- Altered Skin Integrity Present or Discovered   [] LDA Added if Not in Epic (Describe Wound)   [] New Altered Skin Integrity was Present on Admit and Documented in LDA   [] Wound Image Taken    Wound Care Consulted? No    Attending Nurse:  Mary reyes RN     Second RN/Staff Member:   AJ Church

## 2024-02-21 VITALS
WEIGHT: 292.56 LBS | TEMPERATURE: 98 F | RESPIRATION RATE: 16 BRPM | OXYGEN SATURATION: 93 % | HEIGHT: 67 IN | DIASTOLIC BLOOD PRESSURE: 77 MMHG | HEART RATE: 81 BPM | SYSTOLIC BLOOD PRESSURE: 124 MMHG | BODY MASS INDEX: 45.92 KG/M2

## 2024-02-21 LAB — VANCOMYCIN TROUGH SERPL-MCNC: 16.3 UG/ML (ref 10–22)

## 2024-02-21 PROCEDURE — 63600175 PHARM REV CODE 636 W HCPCS: Performed by: STUDENT IN AN ORGANIZED HEALTH CARE EDUCATION/TRAINING PROGRAM

## 2024-02-21 PROCEDURE — 36415 COLL VENOUS BLD VENIPUNCTURE: CPT | Performed by: ORTHOPAEDIC SURGERY

## 2024-02-21 PROCEDURE — 63600175 PHARM REV CODE 636 W HCPCS

## 2024-02-21 PROCEDURE — 97535 SELF CARE MNGMENT TRAINING: CPT

## 2024-02-21 PROCEDURE — 97116 GAIT TRAINING THERAPY: CPT | Mod: CQ

## 2024-02-21 PROCEDURE — 63600175 PHARM REV CODE 636 W HCPCS: Performed by: ORTHOPAEDIC SURGERY

## 2024-02-21 PROCEDURE — 25000003 PHARM REV CODE 250: Performed by: STUDENT IN AN ORGANIZED HEALTH CARE EDUCATION/TRAINING PROGRAM

## 2024-02-21 PROCEDURE — 25000003 PHARM REV CODE 250: Performed by: ORTHOPAEDIC SURGERY

## 2024-02-21 PROCEDURE — 80202 ASSAY OF VANCOMYCIN: CPT | Performed by: ORTHOPAEDIC SURGERY

## 2024-02-21 PROCEDURE — 97530 THERAPEUTIC ACTIVITIES: CPT | Mod: CQ

## 2024-02-21 PROCEDURE — 97530 THERAPEUTIC ACTIVITIES: CPT

## 2024-02-21 RX ORDER — BISACODYL 10 MG/1
10 SUPPOSITORY RECTAL DAILY PRN
Status: DISCONTINUED | OUTPATIENT
Start: 2024-02-21 | End: 2024-02-21 | Stop reason: HOSPADM

## 2024-02-21 RX ORDER — OXYCODONE HYDROCHLORIDE 10 MG/1
10 TABLET ORAL EVERY 4 HOURS PRN
Qty: 42 TABLET | Refills: 0 | Status: SHIPPED | OUTPATIENT
Start: 2024-02-21 | End: 2024-02-27

## 2024-02-21 RX ADMIN — Medication 2 TABLET: at 08:02

## 2024-02-21 RX ADMIN — OXYCODONE HYDROCHLORIDE 10 MG: 10 TABLET ORAL at 02:02

## 2024-02-21 RX ADMIN — HYDROMORPHONE HYDROCHLORIDE 0.5 MG: 0.5 INJECTION, SOLUTION INTRAMUSCULAR; INTRAVENOUS; SUBCUTANEOUS at 08:02

## 2024-02-21 RX ADMIN — TRAMADOL HYDROCHLORIDE 50 MG: 50 TABLET, COATED ORAL at 12:02

## 2024-02-21 RX ADMIN — OXYCODONE HYDROCHLORIDE 10 MG: 10 TABLET ORAL at 12:02

## 2024-02-21 RX ADMIN — HYDROMORPHONE HYDROCHLORIDE 0.5 MG: 0.5 INJECTION, SOLUTION INTRAMUSCULAR; INTRAVENOUS; SUBCUTANEOUS at 04:02

## 2024-02-21 RX ADMIN — METHOCARBAMOL 750 MG: 750 TABLET ORAL at 02:02

## 2024-02-21 RX ADMIN — ATORVASTATIN CALCIUM 40 MG: 40 TABLET, FILM COATED ORAL at 08:02

## 2024-02-21 RX ADMIN — CLONAZEPAM 0.5 MG: 0.5 TABLET ORAL at 04:02

## 2024-02-21 RX ADMIN — OXYCODONE HYDROCHLORIDE 10 MG: 10 TABLET ORAL at 10:02

## 2024-02-21 RX ADMIN — ACETAMINOPHEN 1000 MG: 500 TABLET ORAL at 05:02

## 2024-02-21 RX ADMIN — HYDROMORPHONE HYDROCHLORIDE 0.5 MG: 0.5 INJECTION, SOLUTION INTRAMUSCULAR; INTRAVENOUS; SUBCUTANEOUS at 03:02

## 2024-02-21 RX ADMIN — SENNOSIDES AND DOCUSATE SODIUM 1 TABLET: 8.6; 5 TABLET ORAL at 08:02

## 2024-02-21 RX ADMIN — ACETAMINOPHEN 1000 MG: 500 TABLET ORAL at 11:02

## 2024-02-21 RX ADMIN — METHOCARBAMOL 750 MG: 750 TABLET ORAL at 08:02

## 2024-02-21 RX ADMIN — VANCOMYCIN HYDROCHLORIDE 1500 MG: 1.5 INJECTION, POWDER, LYOPHILIZED, FOR SOLUTION INTRAVENOUS at 01:02

## 2024-02-21 RX ADMIN — ASPIRIN 81 MG: 81 TABLET, COATED ORAL at 08:02

## 2024-02-21 RX ADMIN — ACETAMINOPHEN 1000 MG: 500 TABLET ORAL at 12:02

## 2024-02-21 RX ADMIN — PIPERACILLIN SODIUM AND TAZOBACTAM SODIUM 4.5 G: 4; .5 INJECTION, POWDER, FOR SOLUTION INTRAVENOUS at 08:02

## 2024-02-21 RX ADMIN — HYDROMORPHONE HYDROCHLORIDE 0.5 MG: 0.5 INJECTION, SOLUTION INTRAMUSCULAR; INTRAVENOUS; SUBCUTANEOUS at 11:02

## 2024-02-21 RX ADMIN — PIPERACILLIN SODIUM AND TAZOBACTAM SODIUM 4.5 G: 4; .5 INJECTION, POWDER, FOR SOLUTION INTRAVENOUS at 12:02

## 2024-02-21 RX ADMIN — OXYCODONE HYDROCHLORIDE 10 MG: 10 TABLET ORAL at 05:02

## 2024-02-21 NOTE — NURSING
Nurses Note -- 4 Eyes      2/21/2024   10:42 AM      Skin assessed during: Daily Assessment      [x] No Altered Skin Integrity Present    [x]Prevention Measures Documented      [] Yes- Altered Skin Integrity Present or Discovered   [] LDA Added if Not in Epic (Describe Wound)   [] New Altered Skin Integrity was Present on Admit and Documented in LDA   [] Wound Image Taken    Wound Care Consulted? Yes    Attending Nurse:  Leonarda Briggs RN    Second RN/Staff Member:  Daisy Posey RN

## 2024-02-21 NOTE — PLAN OF CARE
Problem: Adult Inpatient Plan of Care  Goal: Plan of Care Review  Outcome: Ongoing, Progressing  Goal: Patient-Specific Goal (Individualized)  Outcome: Ongoing, Progressing  Goal: Absence of Hospital-Acquired Illness or Injury  Outcome: Ongoing, Progressing  Goal: Optimal Comfort and Wellbeing  Outcome: Ongoing, Progressing    Pt AAOx4, VSS. NADN at this time. Bed in lowest position, with call light in reach along with personal items. Plan of care ongoing.

## 2024-02-21 NOTE — PROGRESS NOTES
Pharmacokinetic Assessment Follow Up: IV Vancomycin    Vancomycin serum concentration assessment(s):    The trough level was drawn correctly and can be used to guide therapy at this time.   The measurement is within the desired definitive target range of 15 to 20 mcg/mL.    Vancomycin Regimen Plan:    Continue regimen of Vancomycin 1500 mg IV every 12 hours  CTM SCr with 0.8 --> 1.0  Next serum trough concentration measured at 2000 on 2/22    Drug levels (last 3 results):  Recent Labs   Lab Result Units 02/21/24  0754   Vancomycin-Trough ug/mL 16.3       Pharmacy will continue to follow and monitor vancomycin.    Please contact pharmacy at extension [phone] for questions regarding this assessment.    Thank you for the consult,   Angel Bustamante       Patient brief summary:  Neeta Hart is a 48 y.o. female initiated on antimicrobial therapy with IV Vancomycin for treatment of bone/joint infection    The patient's current regimen is Vancomycin 1500 mg every 12 hours    Drug Allergies:   Review of patient's allergies indicates:  No Known Allergies    Actual Body Weight:   132.7 kg    Renal Function:   Estimated Creatinine Clearance: 95.6 mL/min (based on SCr of 1 mg/dL).,     Dialysis Method (if applicable):  N/A    CBC (last 72 hours):  Recent Labs   Lab Result Units 02/20/24  1253   WBC K/uL 10.44   Hemoglobin g/dL 11.2*   Hematocrit % 34.9*   Platelets K/uL 286       Metabolic Panel (last 72 hours):  Recent Labs   Lab Result Units 02/19/24  1511 02/20/24  1253   Sodium mmol/L 139 138   Potassium mmol/L 4.1 3.9   Chloride mmol/L 108 107   CO2 mmol/L 18* 25   Glucose mg/dL 154* 125*   BUN mg/dL 16 14   Creatinine mg/dL 0.8 1.0   Albumin g/dL 3.4* 3.3*   Total Bilirubin mg/dL 0.2 0.3   Alkaline Phosphatase U/L 91 96   AST U/L 26 21   ALT U/L 33 22       Vancomycin Administrations:  vancomycin given in the last 96 hours                     vancomycin 1,500 mg in dextrose 5 % (D5W) 250 mL IVPB (Vial-Mate) (mg) 1,500 mg New Bag  02/20/24 2114     1,500 mg New Bag  0957     1,500 mg New Bag 02/19/24 2040    vancomycin (VANCOCIN) 1,000 mg in sodium chloride 0.9% 250 mL IVPB (mg) 1,000 mg New Bag 02/19/24 1357    vancomycin injection (g) 1 g Given 02/19/24 1258    vancomycin injection (g) 4 g Given 02/19/24 1209                    Microbiologic Results:  Microbiology Results (last 7 days)       Procedure Component Value Units Date/Time    AFB Culture & Smear [[id number]] Collected: 02/19/24 1018    Order Status: Completed Specimen: Wound from [place], Right Updated: 02/20/24 2127     AFB Culture & Smear Culture in progress     AFB CULTURE STAIN No acid fast bacilli seen.    Narrative:      Right [place] Tissue #1 Culture    AFB Culture & Smear [[id number]] Collected: 02/19/24 1040    Order Status: Completed Specimen: Wound from [place], Right Updated: 02/20/24 2127     AFB Culture & Smear Culture in progress     AFB CULTURE STAIN No acid fast bacilli seen.    Narrative:      Right [place] Tissue #3 Culture    AFB Culture & Smear [[id number]] Collected: 02/19/24 1031    Order Status: Completed Specimen: Wound from [place], Right Updated: 02/20/24 2127     AFB Culture & Smear Culture in progress     AFB CULTURE STAIN No acid fast bacilli seen.    Narrative:      Right [place] Tissue #2 Culture    AFB Culture & Smear [[id number]] Collected: 02/19/24 1045    Order Status: Completed Specimen: Wound from [place], Right Updated: 02/20/24 2127     AFB Culture & Smear Culture in progress     AFB CULTURE STAIN No acid fast bacilli seen.    Narrative:      Right [place] Tissue #4 Culture    AFB Culture & Smear [[id number]] Collected: 02/19/24 0928    Order Status: Completed Specimen: Wound from [place], Right Updated: 02/20/24 2127     AFB Culture & Smear Culture in progress     AFB CULTURE STAIN No acid fast bacilli seen.    Narrative:      Right Tibia Hardware Culture    Aerobic culture [[id number]] Collected: 02/19/24 1040    Order Status: Completed Specimen: Wound  from [place], Right Updated: 02/20/24 0959     Aerobic Bacterial Culture No growth    Narrative:      Right [place] Tissue #3 Culture    Aerobic culture [[id number]] Collected: 02/19/24 0928    Order Status: Completed Specimen: Wound from [place], Right Updated: 02/20/24 0910     Aerobic Bacterial Culture No growth    Narrative:      Right [place] Hardware Culture    Aerobic culture [[id number]] Collected: 02/19/24 1018    Order Status: Completed Specimen: Wound from [place], Right Updated: 02/20/24 0910     Aerobic Bacterial Culture No growth    Narrative:      Right [place] Tissue #1 Culture    Aerobic culture [[id number]] Collected: 02/19/24 1031    Order Status: Completed Specimen: Wound from [place], Right Updated: 02/20/24 0910     Aerobic Bacterial Culture No growth    Narrative:      Right [place] Tissue #2 Culture    Aerobic culture [[id number]] Collected: 02/19/24 1045    Order Status: Completed Specimen: Wound from [place], Right Updated: 02/20/24 0910     Aerobic Bacterial Culture No growth    Narrative:      Right [place] Tissue #4 Culture    Culture, Anaerobe [[id number]] Collected: 02/19/24 0928    Order Status: Completed Specimen: Wound from [place], Right Updated: 02/20/24 0717     Anaerobic Culture Culture in progress    Narrative:      Right [place] Hardware Culture    Culture, Anaerobe [[id number]] Collected: 02/19/24 1018    Order Status: Completed Specimen: Wound from [place], Right Updated: 02/20/24 0717     Anaerobic Culture Culture in progress    Narrative:      Right [place] Tissue #1 Culture    Culture, Anaerobe [[id number]] Collected: 02/19/24 1040    Order Status: Completed Specimen: Wound from [place], Right Updated: 02/20/24 0717     Anaerobic Culture Culture in progress    Narrative:      Right [place] Tissue #3 Culture    Culture, Anaerobe [[id number]] Collected: 02/19/24 1031    Order Status: Completed Specimen: Wound from [place], Right Updated: 02/20/24 0717     Anaerobic Culture Culture in progress    Narrative:       Right [place] Tissue #2 Culture    Culture, Anaerobe [[id number]] Collected: 02/19/24 1045    Order Status: Completed Specimen: Wound from [place], Right Updated: 02/20/24 0717     Anaerobic Culture Culture in progress    Narrative:      Right [place] Tissue #4 Culture    Gram stain [[id number]] Collected: 02/19/24 0928    Order Status: Completed Specimen: Wound from [place], Right Updated: 02/19/24 1320     Gram Stain Result No WBC's      No organisms seen    Narrative:      Right [place]ia Hardware Culture    Gram stain [[id number]] Collected: 02/19/24 1031    Order Status: Completed Specimen: Wound from [place], Right Updated: 02/19/24 1240     Gram Stain Result No WBC's      No organisms seen    Narrative:      Right [place] Tissue #2 Culture    Gram stain [[id number]] Collected: 02/19/24 1040    Order Status: Completed Specimen: Wound from [place], Right Updated: 02/19/24 1238     Gram Stain Result No WBC's      No organisms seen    Narrative:      Right [place] Tissue #3 Culture    Gram stain [[id number]] Collected: 02/19/24 1045    Order Status: Completed Specimen: Wound from [place], Right Updated: 02/19/24 1225     Gram Stain Result No WBC's      No organisms seen    Narrative:      Right [place] Tissue #4 Culture    Gram stain [[id number]] Collected: 02/19/24 1018    Order Status: Completed Specimen: Wound from [place], Right Updated: 02/19/24 1222     Gram Stain Result No WBC's      No organisms seen    Narrative:      Right [place] Tissue #1 Culture    Fungus culture [[id number]] Collected: 02/19/24 0928    Order Status: Sent Specimen: Wound from [place], Right Updated: 02/19/24 1116    Fungus culture [[id number]] Collected: 02/19/24 1031    Order Status: Sent Specimen: Wound from [place], Right Updated: 02/19/24 1104    Fungus culture [[id number]] Collected: 02/19/24 1040    Order Status: Sent Specimen: Wound from [place], Right Updated: 02/19/24 1104    Fungus culture [[id number]] Collected: 02/19/24 1045    Order Status: Sent  Specimen: Wound from [place], Right Updated: 02/19/24 1102    Fungus culture [[id number]] Collected: 02/19/24 1018    Order Status: Sent Specimen: Wound from [place], Right Updated: 02/19/24 1101

## 2024-02-21 NOTE — SUBJECTIVE & OBJECTIVE
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 Reporting R leg pain at this time. Plan for dc home today.     Review of patient's allergies indicates:  No Known Allergies    Current Facility-Administered Medications   Medication    0.9%  NaCl infusion    acetaminophen tablet 1,000 mg    aspirin EC tablet 81 mg    atorvastatin tablet 40 mg    calcium-vitamin D3 500 mg-5 mcg (200 unit) per tablet 2 tablet    clonazePAM tablet 0.5 mg    DULoxetine DR capsule 30 mg    HYDROmorphone injection 0.5 mg    melatonin tablet 6 mg    methocarbamoL tablet 750 mg    midazolam (VERSED) 1 mg/mL injection 0.5-4 mg    ondansetron tablet 8 mg    oxyCODONE immediate release tablet 5 mg    oxyCODONE immediate release tablet Tab 10 mg    piperacillin-tazobactam (ZOSYN) 4.5 g in dextrose 5 % in water (D5W) 100 mL IVPB (MB+)    polyethylene glycol packet 17 g    scopolamine 1.3-1.5 mg (1 mg over 3 days) 1 patch    senna-docusate 8.6-50 mg per tablet 1 tablet    sodium chloride 0.9% flush 10 mL    sodium chloride 0.9% flush 10 mL    traMADoL tablet 50 mg    traZODone tablet 50 mg    vancomycin - pharmacy to dose    vancomycin 1,500 mg in dextrose 5 % (D5W) 250 mL IVPB (Vial-Mate)     Objective:     Vital Signs (Most Recent):  Temp: 97.7 °F (36.5 °C) (02/21/24 0520)  Pulse: 76 (02/21/24 0520)  Resp: 18 (02/21/24 0522)  BP: 108/62 (02/21/24 0520)  SpO2: 96 % (02/21/24 0520) Vital Signs (24h Range):  Temp:  [97.5 °F (36.4 °C)-98 °F (36.7 °C)] 97.7 °F (36.5 °C)  Pulse:  [74-82] 76  Resp:  [16-20] 18  SpO2:  [94 %-99 %] 96 %  BP: (108-132)/(62-84) 108/62     Weight: 132.7 kg (292 lb 8.8 oz)  Height: 5' 7" (170.2 cm)  Body mass index is 45.82 kg/m².      Intake/Output Summary (Last 24 hours) at 2/21/2024 0815  Last data filed at 2/21/2024 0408  Gross per 24 hour   Intake --   Output 4125 ml   Net -4125 ml        "   Ortho/SPM Exam     Awake/alert/oriented x3, No acute distress, Afebrile, Vital signs stable  Good inspiratory effort with unlaboured breathing  Dressings c/d/i  NVI in operative limb     Significant Labs: All pertinent labs within the past 24 hours have been reviewed.    Significant Imaging: I have reviewed all pertinent imaging results/findings.

## 2024-02-21 NOTE — ASSESSMENT & PLAN NOTE
Neeta Hart is a 48 y.o. female with right tibial plateau fracture nonunion s/p HWR and tibial IMN on 2/19      Pain control: MM  PT/OT: 50% WB RLE  DVT PPx: asa 81 bid, SCDs at all times when not ambulating  Abx: vanc/zosyn pending cx  Cx: NGTD  Labs: Hgb 11.2 yesterday  Celestin: chesterd    Dispo: plan to dc home tomorrow vs Thursday if cx remain negative

## 2024-02-21 NOTE — PLAN OF CARE
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       Future Appointments   Date Time Provider Department Center   2/27/2024  8:20 AM Nicho Balderas MD [facility]           I have seen and examined this patient face to face today. My clinical findings that support the need for the home health skilled services and home bound status are the following:  Weakness/numbness causing balance and gait disturbance due to Fracture making it taxing to leave home.    Allergies:Review of patient's allergies indicates:  No Known Allergies    Diet: regular diet    Activities: 50% wbat RLE    Home Health Admitting Clinician:   SN/PT to complete comprehensive assessment including routine vital signs. Instruct on disease process and s/s of complications to report to MD. Follow specific home health arthoplasty protocol. Review/verify medication list sent home with the patient at time of discharge  and instruct patient/caregiver as needed. If coumadin ordered, coumadin clinic to manage INR with INR draws 2x per week with a goal to maintain INR between 1.8 and 2.2. Frequency may be adjusted depending on start of care date.    Notify MD if SBP > 160 or < 90; DBP > 90 or < 50; HR > 120 or < 50; Temp > 101    Home Medical Equipment:  Walker, 3-1 bedside commode, transfer tub bench    CONSULTS:    Physical Therapy may admit if patient not on coumadin, PT to perform comprehensive assessment if performing admit visit and generate therapy plan of  care. Evaluate for home safety and equipment needs; Establish/upgrade home exercise program. Perform/instruct on therapeutic exercises, gait training, transfer training, and Range of Motion.      OTHER: (only select if patient needs other therapy disciplines)  Occupational Therapy to evaluate and treat. Evaluate home environment for safety and equipment needs. Perform/Instruct on transfers, ADL training, ROM, and therapeutic exercises.   to evaluate for community resources/long-range planning.    MISCELLANEOUS CARE:  Routine Skin for Bedridden Patients: Instruct patient/caregiver to apply moisture barrier cream to all skin folds and wet areas in perineal area daily and after baths and all bowel movements.    WOUND CARE ORDERS:  Assess Surgical Incision/DSRG each TX  Aquacel AG drsg applied post-op leave on 14 days post op. Call MD if any drainage reaches border to border of drsg horizontally, s/s of infection, temp >101, induration, swelling or redness.  If dressing is removed per MD order, then apply island dressing, change/teach caregiver to perform daily dressing change if island dressing present.    Medications: Review discharge medications with patient and family and provide education.      Current Discharge Medication List        START taking these medications    Details   acetaminophen (TYLENOL) 500 MG tablet Take 2 tablets (1,000 mg total) by mouth every 8 (eight) hours.  Qty: 60 tablet, Refills: 0      aspirin (ECOTRIN) 81 MG EC tablet Take 1 tablet (81 mg total) by mouth 2 (two) times daily.  Qty: 60 tablet, Refills: 0      doxycycline (VIBRAMYCIN) 100 MG Cap Take 1 capsule (100 mg total) by mouth 2 (two) times daily.  Qty: 28 capsule, Refills: 0      methocarbamoL (ROBAXIN) 750 MG Tab Take 1 tablet (750 mg total) by mouth 3 (three) times daily as needed (muscle spasms).  Qty: 21 tablet, Refills: 0      oxyCODONE (ROXICODONE) 10 mg Tab immediate release tablet Take 1 tablet (10 mg total) by  mouth every 4 (four) hours as needed for Pain.  Qty: 42 tablet, Refills: 0    Comments: n/a            CONTINUE these medications which have NOT CHANGED    Details   DULoxetine (CYMBALTA) 30 MG capsule Take 30 mg by mouth every evening.      lisinopriL-hydrochlorothiazide (PRINZIDE,ZESTORETIC) 20-25 mg Tab Take 1 tablet by mouth every evening.      rosuvastatin (CRESTOR) 20 MG tablet Take 20 mg by mouth every evening.      traMADoL (ULTRAM) 50 mg tablet Take 1 tablet (50 mg total) by mouth every 8 (eight) hours as needed for Pain.  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      zolpidem (AMBIEN) 5 MG Tab Take 5 mg by mouth nightly as needed.             I certify that this patient is confined to her home and needs intermittent skilled nursing care, physical therapy, and occupational therapy.

## 2024-02-21 NOTE — NURSING
Nurses Note -- 4 Eyes      2/21/2024   3:17 AM      Skin assessed during: Q Shift Change      [x] No Altered Skin Integrity Present    []Prevention Measures Documented      [] Yes- Altered Skin Integrity Present or Discovered   [] LDA Added if Not in Epic (Describe Wound)   [] New Altered Skin Integrity was Present on Admit and Documented in LDA   [] Wound Image Taken    Wound Care Consulted? No    Attending Nurse:  Harry Babcock RN/Staff MeMBER Jaky

## 2024-02-21 NOTE — NURSING
Pt received bedside meds and home wound vac. Home wound vac attached and education given on wound vac usage to pt and spouse. Verbalized understanding. Discharge instructions given, understanding verbalized. All questions answered. PIVs removed, catheters intact. Pt dressed with all belongings. NAD. Ready for discharge. Waiting for transport.

## 2024-02-21 NOTE — PT/OT/SLP PROGRESS
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  She presents with the following performance deficits affecting function: weakness, impaired endurance, impaired self care skills, impaired functional mobility, gait instability, decreased lower extremity function, impaired balance, pain, decreased ROM, orthopedic precautions. Pt willing to participate with encouragement from  at bedside. Pt anxious during session and reluctant to perform chair t/f, tolerated session fairly well overall.    Rehab Prognosis:  Good; patient would benefit from acute skilled OT services to address these deficits and reach maximum level of function.       Plan:     Patient to be seen 4 x/week to address the above listed problems via self-care/home management, therapeutic activities, therapeutic exercises  Plan of Care Expires: 03/20/24  Plan of Care Reviewed with: patient    Subjective     Chief Complaint: L hand pain 2/2 to IV  Patient/Family Comments/goals: "I'm going home soon."  Pain/Comfort:  Pain Rating 1: 8/10  Location - Side 1: Right  Location - Orientation 1: generalized  Location 1: leg  Pain Addressed 1: Reposition, Distraction  Pain Rating Post-Intervention 1:  (not rated)    Objective:     Communicated with: RN prior to session.  Patient found supine with FCD, wound vac, peripheral IV, knee immobilizer upon OT entry to room.    General Precautions: Standard, fall    Orthopedic Precautions:RLE partial weight bearing (50% WB, KI at all times)  Braces: Knee immobilizer  Respiratory Status: Room air     Occupational " Performance:     Bed Mobility:    Patient completed Rolling/Turning to Left with  stand by assistance  Patient completed Scooting/Bridging with stand by assistance  Patient completed Supine to Sit with stand by assistance     Functional Mobility/Transfers:  Patient completed Sit <> Stand Transfer with minimum assistance  with  rolling walker   Chair t/f: Mod A  Functional Mobility: ~4 steps to chair; Mod A; max vc's for sequencing, walker management, placing 50% weight on RLE  Increased time to complete    Activities of Daily Living:  Lower Body Dressing: maximal assistance donned socks sitting eob  Grooming: setup assistance; pt washed face sitting eob    Sitting Balance:  Pt sat eob ~15 min Mod I; use of bed rail; no LOB      AMPAC 6 Click ADL: 17    Treatment & Education:  Pt edu on orthopedic precautions, sequencing during functional ambulation c/ orthopedic precautions, role of OT, POC, safety when performing self care tasks , benefit of performing OOB activity, and safety when performing functional transfers and mobility.  - White board updated  - Self care tasks completed-- as noted above      Nurse present during session to educate pt and  on home wound vac    Patient left up in chair with all lines intact, call button in reach, and RN notified    GOALS:   Multidisciplinary Problems       Occupational Therapy Goals          Problem: Occupational Therapy    Goal Priority Disciplines Outcome Interventions   Occupational Therapy Goal     OT, PT/OT Ongoing, Progressing    Description: Goals to be met by: 03/20/2024     Patient will increase functional independence with ADLs by performing:    UE Dressing with Modified [place].  LE Dressing with Contact Guard Assistance.  Grooming while standing with Contact Guard Assistance.  Toileting from bedside commode with Contact Guard Assistance for hygiene and clothing management.   Supine to sit with Supervision.  Step transfer with Contact Guard Assistance                          Time Tracking:     OT Date of Treatment: 02/21/24  OT Start Time: 1500  OT Stop Time: 1538  OT Total Time (min): 38 min    Billable Minutes:Self Care/Home Management 12  Therapeutic Activity 26    OT/JEYSON: OT          2/21/2024

## 2024-02-21 NOTE — DISCHARGE SUMMARY
[facility] - [facility]  Orthopedics  Discharge Summary      Patient Name: Neeta Hart  MRN: [id number]  Admission Date: 2/19/2024  Hospital Length of Stay: 2 days  Discharge Date and Time:  02/21/2024 3:00 PM  Attending Physician: Nicho Balderas*   Discharging Provider: Stevo Hernandez MD  Primary Care Provider: Jennifer, Primary Doctor    HPI: 48-year-old female, fall from step stool October 7, 2023.  Seen at outside hospital, bluntly [place]  Right bicondylar tibial plateau fracture.  Treated urgently with ORIF, Dr. Garcia, anterior midline incision followed by medial and lateral plating.     Had some wound issues postoperatively requiring wound care and hyperbaric oxygen.  This eventually cleared up.       She has had persistent right knee pain, inability to bear weight since the time of injury.     Knee feels unstable  There is visible gross motion at the fracture site  She is not on antibiotics, but previously was on bactrim and clinda     Lives at home with    Previously independent community ambulator   Self-employed as a realtor   Denies diabetes, heart attack, stroke, blood clot, cancer  Denies tobacco use       Procedure(s) (LRB):  REPAIR,FRACTURE NONUNION,TIBIA PLATEAU - diving board, supine, bone foam. Synthes removal. Synthes DINAH w/ graft capture. [place] tibia nail (9mm). 1/2 inch cardia tubing, simplex cement x2. Vanc x4, tobra x4, methylene blue. Pulse lavage, Bactisure, betadine, peroxide. Have available: clifford ex fix, clifford plateau plates, screw removal. Possible plastics gastroc flap (Right)  REMOVAL, HARDWARE, LOWER EXTREMITY (Right)      Hospital Course: Patient presented for above procedure.  Tolerated it well and was discharged home POD2 after voiding, tolerating diet, ambulating, pain controlled. Discharge instructions, follow-up appointment, and med rec are below.      Consults (From admission, onward)          Status Ordering Provider     Pharmacy to dose Vancomycin  consult  Once        Provider:  (Not yet assigned)   See Hyperspace for full Linked Orders Report.    Acknowledged SHAUNA AMOS            Significant Diagnostic Studies: No pertinent studies.    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  Medications:  Reconciled Home Medications:      Medication List        START taking these medications      acetaminophen 500 MG tablet  Commonly known as: TYLENOL  Take 2 tablets (1,000 mg total) by mouth every 8 (eight) hours.     aspirin 81 MG EC tablet  Commonly known as: ECOTRIN  Take 1 tablet (81 mg total) by mouth 2 (two) times daily.     doxycycline 100 MG Cap  Commonly known as: VIBRAMYCIN  Take 1 capsule (100 mg total) by mouth 2 (two) times daily.     methocarbamoL 750 MG Tab  Commonly known as: ROBAXIN  Take 1 tablet (750 mg total) by mouth 3 (three) times daily as needed (muscle spasms).     oxyCODONE 10 mg Tab immediate release tablet  Commonly known as: ROXICODONE  Take 1 tablet (10 mg total) by mouth every 4 (four) hours as needed for Pain.            CONTINUE taking these medications      DULoxetine 30 MG capsule  Commonly known as: CYMBALTA  Take 30 mg by mouth every evening.     lisinopriL-hydrochlorothiazide 20-25 mg Tab  Commonly known as: PRINZIDE,ZESTORETIC  Take 1 tablet by mouth every evening.     rosuvastatin 20 MG tablet  Commonly known as: CRESTOR  Take 20 mg by mouth every evening.     traMADoL 50 mg tablet  Commonly known as: ULTRAM  Take 1 tablet (50 mg total) by mouth every 8 (eight) hours as needed for Pain.      traZODone 50 MG tablet  Commonly known as: DESYREL  Take 50 mg by mouth every evening.     zolpidem 5 MG Tab  Commonly known as: AMBIEN  Take 5 mg by mouth nightly as needed.              Stevo Hernandez MD  Orthopedics  [facility] - Surgery

## 2024-02-21 NOTE — PROGRESS NOTES
[facility] - [facility]  Orthopedics  Progress Note    Patient Name: Neeta Hart  MRN: [id number]  Admission Date: 2/19/2024  Hospital Length of Stay: 2 days  Attending Provider: Nicho Balderas*  Primary Care Provider: Jennifer, Primary Doctor  Follow-up For: Procedure(s) (LRB):  REPAIR,FRACTURE NONUNION,TIBIA PLATEAU - diving board, supine, bone foam. Synthes removal. Synthes DINAH w/ graft capture. [place] tibia nail (9mm). 1/2 inch cardia tubing, simplex cement x2. Vanc x4, tobra x4, methylene blue. Pulse lavage, Bactisure, betadine, peroxide. Have available: clifford ex fix, clifford plateau plates, screw removal. 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 JILLIANS. Reporting R leg pain at this time. Plan for dc home today.     Review of patient's allergies indicates:  No Known Allergies    Current Facility-Administered Medications   Medication    0.9%  NaCl infusion    acetaminophen tablet 1,000 mg    aspirin EC tablet 81 mg    atorvastatin tablet 40 mg    calcium-vitamin D3 500 mg-5 mcg (200 unit) per tablet 2 tablet    clonazePAM tablet 0.5 mg    DULoxetine DR capsule 30 mg    HYDROmorphone injection 0.5 mg    melatonin tablet 6 mg    methocarbamoL tablet 750 mg    midazolam (VERSED) 1 mg/mL injection 0.5-4 mg    ondansetron tablet 8 mg    oxyCODONE immediate release tablet 5 mg    oxyCODONE immediate release tablet Tab 10 mg    piperacillin-tazobactam (ZOSYN) 4.5 g in dextrose 5 % in water (D5W) 100 mL IVPB (MB+)    polyethylene glycol packet 17 g    scopolamine 1.3-1.5 mg (1 mg over 3 days) 1 patch    senna-docusate 8.6-50 mg per tablet 1 tablet    sodium chloride 0.9% flush 10 mL    sodium chloride 0.9% flush 10 mL    traMADoL tablet 50 mg    traZODone tablet  "50 mg    vancomycin - pharmacy to dose    vancomycin 1,500 mg in dextrose 5 % (D5W) 250 mL IVPB (Vial-Mate)     Objective:     Vital Signs (Most Recent):  Temp: 97.7 °F (36.5 °C) (02/21/24 0520)  Pulse: 76 (02/21/24 0520)  Resp: 18 (02/21/24 0522)  BP: 108/62 (02/21/24 0520)  SpO2: 96 % (02/21/24 0520) Vital Signs (24h Range):  Temp:  [97.5 °F (36.4 °C)-98 °F (36.7 °C)] 97.7 °F (36.5 °C)  Pulse:  [74-82] 76  Resp:  [16-20] 18  SpO2:  [94 %-99 %] 96 %  BP: (108-132)/(62-84) 108/62     Weight: 132.7 kg (292 lb 8.8 oz)  Height: 5' 7" (170.2 cm)  Body mass index is 45.82 kg/m².      Intake/Output Summary (Last 24 hours) at 2/21/2024 0815  Last data filed at 2/21/2024 0408  Gross per 24 hour   Intake --   Output 4125 ml   Net -4125 ml         Ortho/SPM Exam     Awake/alert/oriented x3, No acute distress, Afebrile, Vital signs stable  Good inspiratory effort with unlaboured breathing  Dressings c/d/i  NVI in operative limb     Significant Labs: All pertinent labs within the past 24 hours have been reviewed.    Significant Imaging: I have reviewed all pertinent imaging results/findings.  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 female with right tibial plateau fracture nonunion s/p HWR and tibial IMN on 2/19      Pain control: MM  PT/OT: 50% WB RLE  DVT PPx: asa 81 bid, SCDs at all times when not ambulating  Abx: vanc/zosyn pending cx  Cx: NGTD  Labs: Hgb 11.2 yesterday  Celestin: dced    Dispo: plan to dc home tomorrow vs Thursday if cx remain negative            Stevo Hernandez MD  Orthopedics  [facility] - Surgery    "

## 2024-02-21 NOTE — PT/OT/SLP PROGRESS
Physical Therapy Treatment    Patient Name:  Neeta Hart   MRN:  [id number]    Recommendations:     Discharge Recommendations: High Intensity Therapy  Discharge Equipment Recommendations: bedside commode  Barriers to discharge: None    Assessment:     Neeta Hart is a 48 y.o. 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  She presents with the following impairments/functional limitations: weakness, impaired endurance, impaired functional mobility, impaired self care skills, gait instability, impaired balance, decreased lower extremity function, decreased safety awareness, pain, decreased ROM, impaired skin, edema, orthopedic precautions.    Rehab Prognosis: Good; patient would benefit from acute skilled PT services to address these deficits and reach maximum level of function.    Recent Surgery: Procedure(s) (LRB):  REPAIR,FRACTURE NONUNION,TIBIA PLATEAU - diving board, supine, bone foam. Synthes removal. Synthes DINAH w/ graft capture. [place] tibia nail (9mm). 1/2 inch cardia tubing, simplex cement x2. Vanc x4, tobra x4, methylene blue. Pulse lavage, Bactisure, betadine, peroxide. Have available: clifford ex fix, clifford plateau plates, screw removal. Possible plastics gastroc flap (Right)  REMOVAL, HARDWARE, LOWER EXTREMITY (Right) 2 Days Post-Op    Plan:     During this hospitalization, patient to be seen 4 x/week to address the identified rehab impairments via gait training, therapeutic activities, therapeutic exercises, neuromuscular re-education and progress toward the following goals:    Plan of Care Expires:  03/21/24    Subjective     Chief Complaint: pain  Patient/Family Comments/goals: to go home  Pain/Comfort:  Pain Rating 1: 7/10  Location - Side 1: Right  Location 1: leg  Pain Addressed 1: Pre-medicate for activity, Reposition, Distraction      Objective:     Communicated with RN prior to session.  Patient found HOB elevated with FCD, knee immobilizer,  PureWick, peripheral IV, wound vac upon PT entry to room.     General Precautions: Standard, fall  Orthopedic Precautions:  (RLE 50% with KI on all times)  Braces: Knee immobilizer  Respiratory Status: Room air     Functional Mobility:  Bed Mobility:     Supine to Sit: contact guard assistance  Sit to Supine: stand by assistance  Transfers:     Sit to Stand:  contact guard assistance with rolling walker  Gait: 4 feet with RW CGA; NWB maintained duration of session despite max education on weight bearing status.       AM-PAC 6 CLICK MOBILITY  Turning over in bed (including adjusting bedclothes, sheets and blankets)?: 3  Sitting down on and standing up from a chair with arms (e.g., wheelchair, bedside commode, etc.): 3  Moving from lying on back to sitting on the side of the bed?: 3  Moving to and from a bed to a chair (including a wheelchair)?: 3  Need to walk in hospital room?: 2  Climbing 3-5 steps with a railing?: 1  Basic Mobility Total Score: 15       Treatment & Education:  Pt with increased fatigue and SOB after gait trail. Pt sat EOB with pillow support under RLE while taking medication with SBA. Pt sat EOB during education and weight bearing demonstration with SBA   Education provided on weight bearing status, benefit of weight bearing on bone healing, safety and calling for assistance.   Bedside table in front of patient and area set up for function, convenience, and safety. RN aware of patient's mobility needs and status. Questions/concerns addressed within PTA scope of practice; patient  with no further questions. Time was provided for active listening, discussion of health disposition, and discussion of safe discharge.    Patient left HOB elevated with all lines intact, call button in reach, and family present..    GOALS:   Multidisciplinary Problems       Physical Therapy Goals          Problem: Physical Therapy    Goal Priority Disciplines Outcome Goal Variances Interventions   Physical Therapy Goal      PT, PT/OT Ongoing, Progressing     Description: Goals to be met by: 3/21/24     Patient will increase functional independence with mobility by performin. Supine to sit with Contact Guard Assistance  2. Sit to supine with Contact Guard Assistance  3. Sit to stand transfer with Contact Guard Assistance  4. Bed to chair transfer with Contact Guard Assistance using Rolling Walker  5. Gait  x 50 feet with Contact Guard Assistance using Rolling Walker.   6. Wheelchair propulsion x250 feet with Supervision using bilateral upper extremities                         Time Tracking:     PT Received On: 24  PT Start Time: 1058     PT Stop Time: 1136  PT Total Time (min): 38 min     Billable Minutes: Gait Training 15 and Therapeutic Activity 23    Treatment Type: Treatment  PT/PTA: PTA     Number of PTA visits since last PT visit: 2024

## 2024-02-21 NOTE — PLAN OF CARE
02/21/24 1227   Post-Acute Status   Post-Acute Authorization Home Health   Post-Acute Placement Status Referrals Sent   Home Health Status Referrals Sent   Discharge Plan   Discharge Plan A Home with family;Home Health     Patient to d/c home today. Inpatient rehab discussed based on PT/OT recs but ultimately declined by patient. HH referral and orders sent to [facility] in [place].       Gely ALARCON  Case Management  [facility]  [phone]

## 2024-02-22 LAB
BACTERIA SPEC AEROBE CULT: NO GROWTH

## 2024-02-23 LAB — BACTERIA SPEC ANAEROBE CULT: NORMAL

## 2024-02-23 PROCEDURE — G0180 MD CERTIFICATION HHA PATIENT: HCPCS | Mod: ,,, | Performed by: ORTHOPAEDIC SURGERY

## 2024-02-23 NOTE — PLAN OF CARE
[facility] - [facility]  Discharge Final Note    Primary Care Provider: No, Primary Doctor    Expected Discharge Date: 2/21/2024    Final Discharge Note (most recent)       Final Note - 02/23/24 1000          Final Note    Assessment Type Final Discharge Note     Anticipated Discharge Disposition Home-Health Care [facility]     What phone number can be called within the next 1-3 days to see how you are doing after discharge? --   [phone]       Post-Acute Status    Post-Acute Authorization Home Health     Post-Acute Placement Status Set-up Complete/Auth obtained                     Important Message from Medicare           Future Appointments   Date Time Provider Department Center   2/27/2024  8:20 AM Nicho Balderas MD [facility]     Patient discharged home to care of family and [facility]  on 2/21/24.    Gely Marcano RNCM  Case Management  [facility]  [phone]

## 2024-02-26 LAB
BACTERIA SPEC ANAEROBE CULT: NORMAL

## 2024-02-27 ENCOUNTER — OFFICE VISIT (OUTPATIENT)
Dept: ORTHOPEDICS | Facility: CLINIC | Age: 49
End: 2024-02-27
Payer: COMMERCIAL

## 2024-02-27 VITALS — HEART RATE: 77 BPM | SYSTOLIC BLOOD PRESSURE: 117 MMHG | DIASTOLIC BLOOD PRESSURE: 85 MMHG

## 2024-02-27 DIAGNOSIS — S82.141K CLOSED FRACTURE OF RIGHT TIBIAL PLATEAU WITH NONUNION: Primary | ICD-10-CM

## 2024-02-27 PROCEDURE — 99024 POSTOP FOLLOW-UP VISIT: CPT | Mod: S$GLB,,, | Performed by: ORTHOPAEDIC SURGERY

## 2024-02-27 PROCEDURE — 4010F ACE/ARB THERAPY RXD/TAKEN: CPT | Mod: CPTII,S$GLB,, | Performed by: ORTHOPAEDIC SURGERY

## 2024-02-27 PROCEDURE — 99999 PR PBB SHADOW E&M-EST. PATIENT-LVL III: CPT | Mod: PBBFAC,,, | Performed by: ORTHOPAEDIC SURGERY

## 2024-02-27 PROCEDURE — 3079F DIAST BP 80-89 MM HG: CPT | Mod: CPTII,S$GLB,, | Performed by: ORTHOPAEDIC SURGERY

## 2024-02-27 PROCEDURE — 3074F SYST BP LT 130 MM HG: CPT | Mod: CPTII,S$GLB,, | Performed by: ORTHOPAEDIC SURGERY

## 2024-02-27 PROCEDURE — 1159F MED LIST DOCD IN RCRD: CPT | Mod: CPTII,S$GLB,, | Performed by: ORTHOPAEDIC SURGERY

## 2024-02-27 RX ORDER — OXYCODONE HYDROCHLORIDE 10 MG/1
10 TABLET ORAL EVERY 6 HOURS PRN
Qty: 28 TABLET | Refills: 0 | Status: SHIPPED | OUTPATIENT
Start: 2024-02-27 | End: 2024-03-03 | Stop reason: SDUPTHER

## 2024-02-27 RX ORDER — METHOCARBAMOL 750 MG/1
750 TABLET, FILM COATED ORAL 3 TIMES DAILY PRN
Qty: 21 TABLET | Refills: 0 | Status: SHIPPED | OUTPATIENT
Start: 2024-02-27 | End: 2024-03-04 | Stop reason: SDUPTHER

## 2024-02-27 NOTE — PROGRESS NOTES
CC:  Postop R tib nonunion repair    HPI:  48-year-old female, fall from step ladder 10/07/2023   Right bicondylar tibial plateau fracture   Treated at outside hospital via anterior midline knee incision and dual plating, wound issues postop  +Nonunion.     2.19.24 - removal hardware R prox tib, nonunion repair/abx IMN R tibia, complex closure    SUBJECTIVE:  Doing well  Denies fevers, chills, wound drainage  Pain controlled with oxycodone      OBJECTIVE:  PE  Alert/oriented x3, no acute distress, breathing comfortably, equal chest rise bilaterally  Right lower extremity   Dressings removed incision healing well with the exception of a small area proximal anterior tibia where she had previous wound issues, this area has some skin sloughing, appears to be superficial, no significant drainage, no purulence, no surrounding erythema   Knee range of motion 5-60 flexion  Ankle range of motion 5 dorsiflexion, 5 plantar flexion   Motor function intact hip flexion, quad, hamstring, gastroc, tibialis anterior, peroneal, EHL, FHL  Sensation intact to light touch saphenous, sural, deep peroneal, superficial peroneal, tibial nerves.  Palpable DP/PT pulse, brisk cap refill    XRAYS:  X-ray right tibia demonstrate intramedullary nail in place, stable alignment of fracture    Cultures no growth to date    ASSESSMENT:  48-year-old female, fall from step ladder 10/07/2023   Right bicondylar tibial plateau fracture   Treated at outside hospital via anterior midline knee incision and dual plating, wound issues postop  +Nonunion.     2.19.24 - removal hardware R prox tib, nonunion repair/abx IMN R tibia, complex closure    PLAN:  Abx  2 wks doxycycline postop  Adjust prn based on cx results     ASA 81mg BID x 6 wks for DVT prophylaxis  50% WB RLE x6 wks postop  Knee immobilizer x 2 wks postop for soft tissue rest, ROMAT thereafter     Ca, Vit D  Bone stim     X-rays at subsequent followups:  R knee  R tibia     Follow-up postop    2-3  weeks - staple removal, remove knee immobilizer, XR  6 weeks, 3 months, 6 months, 1 year

## 2024-02-28 LAB — BACTERIA SPEC ANAEROBE CULT: NORMAL

## 2024-03-03 DIAGNOSIS — S82.141K CLOSED FRACTURE OF RIGHT TIBIAL PLATEAU WITH NONUNION: ICD-10-CM

## 2024-03-04 DIAGNOSIS — S82.141K CLOSED FRACTURE OF RIGHT TIBIAL PLATEAU WITH NONUNION: ICD-10-CM

## 2024-03-04 RX ORDER — OXYCODONE HYDROCHLORIDE 10 MG/1
10 TABLET ORAL EVERY 6 HOURS PRN
Qty: 28 TABLET | Refills: 0 | Status: SHIPPED | OUTPATIENT
Start: 2024-03-04 | End: 2024-03-20

## 2024-03-04 RX ORDER — METHOCARBAMOL 750 MG/1
750 TABLET, FILM COATED ORAL 3 TIMES DAILY PRN
Qty: 21 TABLET | Refills: 0 | Status: SHIPPED | OUTPATIENT
Start: 2024-03-04 | End: 2024-03-19

## 2024-03-08 NOTE — ANESTHESIA PREPROCEDURE EVALUATION
"                                                                                                             02/19/2024  Neeta Hart is a 48 y.o., female.  Pre-operative evaluation for Procedure(s) (LRB):  REPAIR,FRACTURE NONUNION,TIBIA PLATEAU - diving board, supine, bone foam. Synthes removal. Synthes DINAH w/ graft capture. [place] tibia nail (9mm). 1/2 inch cardia tubing, simplex cement x2. Vanc x4, tobra x4, methylene blue. Pulse lavage, Bactisure, betadine, peroxide. Have available: bharathi ex fix, bharathi plateau plates, screw removal. Possible plastics gastroc flap (Right)    Neeta Hart is a 48 y.o. female       There is no problem list on file for this patient.      Past Surgical History:   Procedure Laterality Date    left ankle surgery      right leg surgery         Social History     Socioeconomic History    Marital status:        No current facility-administered medications on file prior to encounter.     Current Outpatient Medications on File Prior to Encounter   Medication Sig Dispense Refill    DULoxetine (CYMBALTA) 30 MG capsule Take 30 mg by mouth every evening.      lisinopriL-hydrochlorothiazide (PRINZIDE,ZESTORETIC) 20-25 mg Tab Take 1 tablet by mouth every evening.      rosuvastatin (CRESTOR) 20 MG tablet Take 20 mg by mouth every evening.      traZODone (DESYREL) 50 MG tablet Take 50 mg by mouth every evening.      zolpidem (AMBIEN) 5 MG Tab Take 5 mg by mouth nightly as needed.         Review of patient's allergies indicates:  No Known Allergies      CBC: No results for input(s): "WBC", "RBC", "HGB", "HCT", "PLT", "MCV", "MCH", "MCHC" in the last 72 hours.    CMP: No results for input(s): "NA", "K", "CL", "CO2", "BUN", "CREATININE", "GLU", "MG", "PHOS", "CALCIUM", "ALBUMIN", "PROT", "ALKPHOS", "ALT", "AST", "BILITOT" in the last 72 hours.    INR  No results for input(s): "PT", "INR", "PROTIME", "APTT" in the last 72 hours.      Diagnostic Studies:    EKG:   No results found for this or " Patient's wife I calling and needs more meds and she would like to speak with Madhavi please advise.    "any previous visit.    TTE:  No results found for this or any previous visit.  No results found for: "EF"   No results found for this or any previous visit.    JOSE ALEJANDRO:  No results found for this or any previous visit.    Stress Test:  No results found for this or any previous visit.       LHC:  No results found for this or any previous visit.       PFT:  No results found for: "FEV1", "FVC", "ORI4NDB", "TLC", "DLCO"     ALLERGIES:   Review of patient's allergies indicates:  No Known Allergies  LDA:          Lines/Drains/Airways       Peripheral Intravenous Line  Duration                  Peripheral IV - Single Lumen 02/19/24 0600 18 G Left Forearm <1 day                   Anesthesia Evaluation      Airway   Mallampati: II  TM distance: > 6 cm  Neck ROM: Normal ROM  Dental    (+) Intact    Pulmonary    Cardiovascular   (+) hypertension    Rate: Normal    Neuro/Psych      GI/Hepatic/Renal      Endo/Other    Abdominal                           Pre-op Assessment    I have reviewed the Patient Summary Reports.     I have reviewed the Nursing Notes. I have reviewed the NPO Status.   I have reviewed the Medications.     Review of Systems  Anesthesia Hx:  No problems with previous Anesthesia             Denies Family Hx of Anesthesia complications.    Denies Personal Hx of Anesthesia complications.                    Cardiovascular:     Hypertension                                        Pulmonary:  Pulmonary Normal                       Renal/:  Renal/ Normal                 Hepatic/GI:  Hepatic/GI Normal                 Neurological:  Neurology Normal                                      Endocrine:  Endocrine Normal                Physical Exam  General: Well nourished    Airway:  Mallampati: II   Mouth Opening: Normal  TM Distance: > 6 cm  Tongue: Normal  Neck ROM: Normal ROM    Dental:  Intact    Chest/Lungs:  Normal Respiratory Rate    Heart:  Rate: Normal        Anesthesia Plan  Type of Anesthesia, risks & benefits " discussed:    Anesthesia Type: Gen ETT  Intra-op Monitoring Plan: Standard ASA Monitors  Post Op Pain Control Plan: multimodal analgesia and IV/PO Opioids PRN  Induction:  IV  Airway Plan: Direct, Post-Induction  Informed Consent: Informed consent signed with the Patient and all parties understand the risks and agree with anesthesia plan.  All questions answered. Patient consented to blood products? Yes  ASA Score: 2  Day of Surgery Review of History & Physical: H&P Update referred to the surgeon/provider.    Ready For Surgery From Anesthesia Perspective.     .

## 2024-03-11 DIAGNOSIS — S82.141K CLOSED FRACTURE OF RIGHT TIBIAL PLATEAU WITH NONUNION: Primary | ICD-10-CM

## 2024-03-12 ENCOUNTER — HOSPITAL ENCOUNTER (OUTPATIENT)
Dept: RADIOLOGY | Facility: HOSPITAL | Age: 49
Discharge: HOME | End: 2024-03-12
Attending: ORTHOPAEDIC SURGERY
Payer: COMMERCIAL

## 2024-03-12 ENCOUNTER — OFFICE VISIT (OUTPATIENT)
Dept: ORTHOPEDICS | Facility: CLINIC | Age: 49
End: 2024-03-12
Payer: COMMERCIAL

## 2024-03-12 VITALS
HEART RATE: 81 BPM | RESPIRATION RATE: 18 BRPM | BODY MASS INDEX: 45.82 KG/M2 | DIASTOLIC BLOOD PRESSURE: 91 MMHG | SYSTOLIC BLOOD PRESSURE: 130 MMHG | TEMPERATURE: 98 F | HEIGHT: 67 IN

## 2024-03-12 DIAGNOSIS — S82.141K CLOSED FRACTURE OF RIGHT TIBIAL PLATEAU WITH NONUNION: Primary | ICD-10-CM

## 2024-03-12 DIAGNOSIS — S82.141K CLOSED FRACTURE OF RIGHT TIBIAL PLATEAU WITH NONUNION: ICD-10-CM

## 2024-03-12 PROCEDURE — 1159F MED LIST DOCD IN RCRD: CPT | Mod: CPTII,S$GLB,, | Performed by: ORTHOPAEDIC SURGERY

## 2024-03-12 PROCEDURE — 99999 PR PBB SHADOW E&M-EST. PATIENT-LVL III: CPT | Mod: PBBFAC,,, | Performed by: ORTHOPAEDIC SURGERY

## 2024-03-12 PROCEDURE — 73590 X-RAY EXAM OF LOWER LEG: CPT | Mod: 26,RT,, | Performed by: RADIOLOGY

## 2024-03-12 PROCEDURE — 4010F ACE/ARB THERAPY RXD/TAKEN: CPT | Mod: CPTII,S$GLB,, | Performed by: ORTHOPAEDIC SURGERY

## 2024-03-12 PROCEDURE — 73590 X-RAY EXAM OF LOWER LEG: CPT | Mod: TC,RT

## 2024-03-12 PROCEDURE — 99024 POSTOP FOLLOW-UP VISIT: CPT | Mod: S$GLB,,, | Performed by: ORTHOPAEDIC SURGERY

## 2024-03-12 PROCEDURE — 3075F SYST BP GE 130 - 139MM HG: CPT | Mod: CPTII,S$GLB,, | Performed by: ORTHOPAEDIC SURGERY

## 2024-03-12 PROCEDURE — 3080F DIAST BP >= 90 MM HG: CPT | Mod: CPTII,S$GLB,, | Performed by: ORTHOPAEDIC SURGERY

## 2024-03-12 RX ORDER — METHOCARBAMOL 500 MG/1
500 TABLET, FILM COATED ORAL 4 TIMES DAILY
Qty: 40 TABLET | Refills: 0 | Status: SHIPPED | OUTPATIENT
Start: 2024-03-12 | End: 2024-04-01 | Stop reason: SDUPTHER

## 2024-03-12 RX ORDER — DOXYCYCLINE 100 MG/1
100 CAPSULE ORAL 2 TIMES DAILY
Qty: 20 CAPSULE | Refills: 0 | Status: SHIPPED | OUTPATIENT
Start: 2024-03-12 | End: 2024-03-22

## 2024-03-12 RX ORDER — ACETAMINOPHEN 325 MG/1
325 TABLET ORAL EVERY 6 HOURS PRN
Qty: 90 TABLET | Refills: 5 | Status: SHIPPED | OUTPATIENT
Start: 2024-03-12 | End: 2024-03-20 | Stop reason: SDUPTHER

## 2024-03-12 RX ORDER — OXYCODONE HYDROCHLORIDE 5 MG/1
5 TABLET ORAL EVERY 4 HOURS PRN
Qty: 30 TABLET | Refills: 0 | Status: SHIPPED | OUTPATIENT
Start: 2024-03-12 | End: 2024-03-17 | Stop reason: SDUPTHER

## 2024-03-12 NOTE — PROGRESS NOTES
CC:  Postop R tib nonunion repair    HPI:  48-year-old female, fall from step ladder 10/07/2023   Right bicondylar tibial plateau fracture   Treated at outside hospital via anterior midline knee incision and dual plating, wound issues postop  +Nonunion.     2.19.24 - removal hardware R prox tib, nonunion repair/abx IMN R tibia, complex closure    SUBJECTIVE:  Doing well  Denies fevers, chills, wound drainage  Pain controlled with oxycodone      OBJECTIVE:  PE  Alert/oriented x3, no acute distress, breathing comfortably, equal chest rise bilaterally  Right lower extremity   Dressings removed incision healing well with the exception of a small area proximal anterior tibia where she had previous wound issues, this area has some skin sloughing, appears to be superficial, no significant drainage, no purulence, no surrounding erythema   Knee range of motion 5-80 flexion  Ankle range of motion 5 dorsiflexion, 5 plantar flexion   No pain w/ axial loading  Motor function intact hip flexion, quad, hamstring, gastroc, tibialis anterior, peroneal, EHL, FHL  Sensation intact to light touch saphenous, sural, deep peroneal, superficial peroneal, tibial nerves.  Palpable DP/PT pulse, brisk cap refill    XRAYS:  X-ray right tibia demonstrate intramedullary nail in place, stable alignment of fracture    Cx neg    ASSESSMENT:  48-year-old female, fall from step ladder 10/07/2023   Right bicondylar tibial plateau fracture   Treated at outside hospital via anterior midline knee incision and dual plating, wound issues postop  +Nonunion.     2.19.24 - removal hardware R prox tib, nonunion repair/abx IMN R tibia, complex closure    PLAN:  Doxy x 10 days  F/u 1 wk for wound check  OK to shower  Pain prox ant tib wound w/ betadine, cover w/ silver dressing, gauze       ASA 81mg BID x 6 wks for DVT prophylaxis  50% WB RLE x6 wks postop  ROMAT  Stress obtaining full knee extension, pushing flexion can wait due to fx characteristics     Ca,  Vit D  Bone stim

## 2024-03-17 DIAGNOSIS — S82.141K CLOSED FRACTURE OF RIGHT TIBIAL PLATEAU WITH NONUNION: ICD-10-CM

## 2024-03-18 LAB
FUNGUS SPEC CULT: NORMAL

## 2024-03-20 ENCOUNTER — OFFICE VISIT (OUTPATIENT)
Dept: ORTHOPEDICS | Facility: CLINIC | Age: 49
End: 2024-03-20
Payer: COMMERCIAL

## 2024-03-20 VITALS
SYSTOLIC BLOOD PRESSURE: 132 MMHG | BODY MASS INDEX: 45.83 KG/M2 | HEART RATE: 81 BPM | DIASTOLIC BLOOD PRESSURE: 91 MMHG | RESPIRATION RATE: 18 BRPM | HEIGHT: 67 IN | TEMPERATURE: 98 F | WEIGHT: 292 LBS

## 2024-03-20 DIAGNOSIS — S82.141K CLOSED FRACTURE OF RIGHT TIBIAL PLATEAU WITH NONUNION: Primary | ICD-10-CM

## 2024-03-20 PROCEDURE — 99024 POSTOP FOLLOW-UP VISIT: CPT | Mod: S$GLB,,, | Performed by: ORTHOPAEDIC SURGERY

## 2024-03-20 PROCEDURE — 3080F DIAST BP >= 90 MM HG: CPT | Mod: CPTII,S$GLB,, | Performed by: ORTHOPAEDIC SURGERY

## 2024-03-20 PROCEDURE — 3075F SYST BP GE 130 - 139MM HG: CPT | Mod: CPTII,S$GLB,, | Performed by: ORTHOPAEDIC SURGERY

## 2024-03-20 PROCEDURE — 1159F MED LIST DOCD IN RCRD: CPT | Mod: CPTII,S$GLB,, | Performed by: ORTHOPAEDIC SURGERY

## 2024-03-20 PROCEDURE — 4010F ACE/ARB THERAPY RXD/TAKEN: CPT | Mod: CPTII,S$GLB,, | Performed by: ORTHOPAEDIC SURGERY

## 2024-03-20 PROCEDURE — 99999 PR PBB SHADOW E&M-EST. PATIENT-LVL IV: CPT | Mod: PBBFAC,,, | Performed by: ORTHOPAEDIC SURGERY

## 2024-03-20 RX ORDER — ACETAMINOPHEN 325 MG/1
325 TABLET ORAL EVERY 6 HOURS PRN
Qty: 90 TABLET | Refills: 5 | Status: SHIPPED | OUTPATIENT
Start: 2024-03-20 | End: 2024-03-20

## 2024-03-20 RX ORDER — OXYCODONE HYDROCHLORIDE 5 MG/1
5 TABLET ORAL EVERY 4 HOURS PRN
Qty: 30 TABLET | Refills: 0 | Status: SHIPPED | OUTPATIENT
Start: 2024-03-20 | End: 2024-03-25 | Stop reason: SDUPTHER

## 2024-03-20 RX ORDER — ACETAMINOPHEN 325 MG/1
325 TABLET ORAL EVERY 6 HOURS PRN
Qty: 90 TABLET | Refills: 5 | Status: SHIPPED | OUTPATIENT
Start: 2024-03-20

## 2024-03-20 NOTE — PROGRESS NOTES
CC:  Postop R tib nonunion repair    HPI:  48-year-old female, fall from step ladder 10/07/2023   Right bicondylar tibial plateau fracture   Treated at outside hospital via anterior midline knee incision and dual plating, wound issues postop  +Nonunion.     2.19.24 - removal hardware R prox tib, nonunion repair/abx IMN R tibia, complex closure    SUBJECTIVE:  Doing well  Denies fevers, chills, wound drainage  Pain controlled with oxycodone      OBJECTIVE:  PE  Alert/oriented x3, no acute distress, breathing comfortably, equal chest rise bilaterally  Right lower extremity   Dressings removed incision healing well with the exception of a small area proximal anterior tibia where she had previous wound issues, this area is now scabbed over, appears to be superficial, no significant drainage, no purulence, no surrounding erythema   Knee range of motion 5-90 flexion  Ankle range of motion 5 dorsiflexion, 5 plantar flexion   No pain w/ axial loading  Motor function intact hip flexion, quad, hamstring, gastroc, tibialis anterior, peroneal, EHL, FHL  Sensation intact to light touch saphenous, sural, deep peroneal, superficial peroneal, tibial nerves.  Palpable DP/PT pulse, brisk cap refill    XRAYS:  None new    Cx neg    ASSESSMENT:  48-year-old female, fall from step ladder 10/07/2023   Right bicondylar tibial plateau fracture   Treated at outside hospital via anterior midline knee incision and dual plating, wound issues postop  +Nonunion.     2.19.24 - removal hardware R prox tib, nonunion repair/abx IMN R tibia, complex closure    PLAN:    F/u 2 wks  XR R tibia, R knee    OK to shower  Paint prox ant tib wound w/ betadine, cover w/ silver dressing, gauze    ASA 81mg BID x 6 wks for DVT prophylaxis  50% WB RLE x6 wks postop  ROMAT  Stress obtaining full knee extension, pushing flexion can wait due to fx characteristics  PT     Ca, Vit D  Bone stim

## 2024-03-25 DIAGNOSIS — S82.141K CLOSED FRACTURE OF RIGHT TIBIAL PLATEAU WITH NONUNION: ICD-10-CM

## 2024-03-25 RX ORDER — OXYCODONE HYDROCHLORIDE 5 MG/1
5 TABLET ORAL EVERY 6 HOURS PRN
Qty: 28 TABLET | Refills: 0 | Status: SHIPPED | OUTPATIENT
Start: 2024-03-25 | End: 2024-04-01 | Stop reason: SDUPTHER

## 2024-03-28 ENCOUNTER — TELEPHONE (OUTPATIENT)
Dept: ORTHOPEDICS | Facility: CLINIC | Age: 49
End: 2024-03-28
Payer: COMMERCIAL

## 2024-04-01 DIAGNOSIS — S82.141K CLOSED FRACTURE OF RIGHT TIBIAL PLATEAU WITH NONUNION: ICD-10-CM

## 2024-04-01 RX ORDER — METHOCARBAMOL 500 MG/1
500 TABLET, FILM COATED ORAL 4 TIMES DAILY
Qty: 40 TABLET | Refills: 0 | Status: SHIPPED | OUTPATIENT
Start: 2024-04-01 | End: 2024-04-15 | Stop reason: SDUPTHER

## 2024-04-01 RX ORDER — OXYCODONE HYDROCHLORIDE 5 MG/1
5 TABLET ORAL EVERY 6 HOURS PRN
Qty: 28 TABLET | Refills: 0 | Status: SHIPPED | OUTPATIENT
Start: 2024-04-01 | End: 2024-04-08 | Stop reason: SDUPTHER

## 2024-04-03 ENCOUNTER — HOSPITAL ENCOUNTER (OUTPATIENT)
Dept: RADIOLOGY | Facility: HOSPITAL | Age: 49
Discharge: HOME | End: 2024-04-03
Attending: ORTHOPAEDIC SURGERY
Payer: COMMERCIAL

## 2024-04-03 ENCOUNTER — OFFICE VISIT (OUTPATIENT)
Dept: ORTHOPEDICS | Facility: CLINIC | Age: 49
End: 2024-04-03
Payer: COMMERCIAL

## 2024-04-03 DIAGNOSIS — S82.141K CLOSED FRACTURE OF RIGHT TIBIAL PLATEAU WITH NONUNION: Primary | ICD-10-CM

## 2024-04-03 DIAGNOSIS — S82.141K CLOSED FRACTURE OF RIGHT TIBIAL PLATEAU WITH NONUNION: ICD-10-CM

## 2024-04-03 PROCEDURE — 73560 X-RAY EXAM OF KNEE 1 OR 2: CPT | Mod: 26,RT,, | Performed by: RADIOLOGY

## 2024-04-03 PROCEDURE — 99024 POSTOP FOLLOW-UP VISIT: CPT | Mod: S$GLB,,, | Performed by: ORTHOPAEDIC SURGERY

## 2024-04-03 PROCEDURE — 4010F ACE/ARB THERAPY RXD/TAKEN: CPT | Mod: CPTII,S$GLB,, | Performed by: ORTHOPAEDIC SURGERY

## 2024-04-03 PROCEDURE — 73590 X-RAY EXAM OF LOWER LEG: CPT | Mod: TC,RT

## 2024-04-03 PROCEDURE — 73560 X-RAY EXAM OF KNEE 1 OR 2: CPT | Mod: TC,RT

## 2024-04-03 PROCEDURE — 99999 PR PBB SHADOW E&M-EST. PATIENT-LVL III: CPT | Mod: PBBFAC,,, | Performed by: ORTHOPAEDIC SURGERY

## 2024-04-03 PROCEDURE — 73590 X-RAY EXAM OF LOWER LEG: CPT | Mod: 26,RT,, | Performed by: RADIOLOGY

## 2024-04-03 NOTE — PROGRESS NOTES
CC:  Postop R tib nonunion repair    HPI:  48-year-old female, fall from step ladder 10/07/2023   Right bicondylar tibial plateau fracture   Treated at outside hospital via anterior midline knee incision and dual plating, wound issues postop  +Nonunion.     2.19.24 - removal hardware R prox tib, nonunion repair/abx IMN R tibia, complex closure    SUBJECTIVE:  Now 6 weeks postop.    Doing well and has begun bearing weight on the RLE with  physical therapy.  Reports small wound at mid-incision has been progressively healing without drainage or increasing pain.  Denies fevers/chills.        OBJECTIVE:  PE  Alert/oriented x3, no acute distress, breathing comfortably, equal chest rise bilaterally  Right lower extremity   Dressings removed incision healing well with the exception of a small area proximal anterior tibia where she had previous wound issues, this area is now scabbed over, appears to be superficial, no significant drainage, no purulence, no surrounding erythema   Knee range of motion 5-100 flexion  Ankle range of motion 5 dorsiflexion, 5 plantar flexion   No pain w/ axial loading  Motor function intact hip flexion, quad, hamstring, gastroc, tibialis anterior, peroneal, EHL, FHL  Sensation intact to light touch saphenous, sural, deep peroneal, superficial peroneal, tibial nerves.  Palpable DP/PT pulse, brisk cap refill    XRAYS:  R-tibial IMN in place with no signs of hardware failure; no new fractures     Cx neg    ASSESSMENT:  48-year-old female, fall from step ladder 10/07/2023   Right bicondylar tibial plateau fracture   Treated at outside hospital via anterior midline knee incision and dual plating, wound issues postop  +Nonunion.     2.19.24 - removal hardware R prox tib, nonunion repair/abx IMN R tibia, complex closure    PLAN:    F/u 6 wks  XR R tibia, R knee    OK to shower  Continue to paint prox ant tib wound w/ betadine, cover w/ silver dressing, gauze    Completed ASA 81mg BID x 6 wks for DVT  prophylaxis  WBAT & ROMAT RLE   Stress obtaining full knee extension, pushing flexion can wait due to fx characteristics  Continue HH PT    Ca, Vit D  Bone stim

## 2024-04-05 ENCOUNTER — TELEPHONE (OUTPATIENT)
Dept: BARIATRICS | Facility: CLINIC | Age: 49
End: 2024-04-05
Payer: COMMERCIAL

## 2024-04-05 NOTE — TELEPHONE ENCOUNTER
----- Message from Gladys Zhang sent at 4/5/2024  2:57 PM CDT -----  Type:  Sooner Apoointment Request    Caller is requesting a sooner appointment.  Caller declined first available appointment listed below.  Caller will not accept being placed on the waitlist and is requesting a message be sent to doctor.    Name of Caller:pt  When is the first available appointment?  Symptoms:  Would the patient rather a call back or a response via [facility]? call  Best Call Back Number:[phone]  Additional Information: np, looking to schedule

## 2024-04-08 DIAGNOSIS — S82.141K CLOSED FRACTURE OF RIGHT TIBIAL PLATEAU WITH NONUNION: ICD-10-CM

## 2024-04-08 LAB
ACID FAST MOD KINY STN SPEC: NORMAL
MYCOBACTERIUM SPEC QL CULT: NORMAL

## 2024-04-08 RX ORDER — OXYCODONE HYDROCHLORIDE 5 MG/1
5 TABLET ORAL EVERY 6 HOURS PRN
Qty: 28 TABLET | Refills: 0 | Status: SHIPPED | OUTPATIENT
Start: 2024-04-08 | End: 2024-04-15 | Stop reason: SDUPTHER

## 2024-04-15 DIAGNOSIS — S82.141K CLOSED FRACTURE OF RIGHT TIBIAL PLATEAU WITH NONUNION: ICD-10-CM

## 2024-04-15 RX ORDER — METHOCARBAMOL 500 MG/1
500 TABLET, FILM COATED ORAL 4 TIMES DAILY
Qty: 40 TABLET | Refills: 0 | Status: SHIPPED | OUTPATIENT
Start: 2024-04-15 | End: 2024-04-25

## 2024-04-15 RX ORDER — OXYCODONE HYDROCHLORIDE 5 MG/1
5 TABLET ORAL EVERY 8 HOURS PRN
Qty: 21 TABLET | Refills: 0 | Status: SHIPPED | OUTPATIENT
Start: 2024-04-15 | End: 2024-04-22 | Stop reason: SDUPTHER

## 2024-04-22 DIAGNOSIS — S82.141K CLOSED FRACTURE OF RIGHT TIBIAL PLATEAU WITH NONUNION: ICD-10-CM

## 2024-04-22 RX ORDER — OXYCODONE HYDROCHLORIDE 5 MG/1
5 TABLET ORAL EVERY 8 HOURS PRN
Qty: 21 TABLET | Refills: 0 | Status: SHIPPED | OUTPATIENT
Start: 2024-04-22 | End: 2024-04-29 | Stop reason: SDUPTHER

## 2024-04-29 ENCOUNTER — EXTERNAL HOME HEALTH (OUTPATIENT)
Dept: HOME HEALTH SERVICES | Facility: HOSPITAL | Age: 49
End: 2024-04-29
Payer: COMMERCIAL

## 2024-04-29 DIAGNOSIS — S82.141K CLOSED FRACTURE OF RIGHT TIBIAL PLATEAU WITH NONUNION: ICD-10-CM

## 2024-04-29 RX ORDER — OXYCODONE HYDROCHLORIDE 5 MG/1
5 TABLET ORAL EVERY 12 HOURS PRN
Qty: 14 TABLET | Refills: 0 | Status: SHIPPED | OUTPATIENT
Start: 2024-04-29 | End: 2024-05-06 | Stop reason: SDUPTHER

## 2024-05-06 DIAGNOSIS — S82.141K CLOSED FRACTURE OF RIGHT TIBIAL PLATEAU WITH NONUNION: ICD-10-CM

## 2024-05-06 RX ORDER — OXYCODONE HYDROCHLORIDE 5 MG/1
5 TABLET ORAL EVERY 12 HOURS PRN
Qty: 14 TABLET | Refills: 0 | Status: SHIPPED | OUTPATIENT
Start: 2024-05-06 | End: 2024-05-13 | Stop reason: SDUPTHER

## 2024-05-10 DIAGNOSIS — S82.141K CLOSED FRACTURE OF RIGHT TIBIAL PLATEAU WITH NONUNION: Primary | ICD-10-CM

## 2024-05-13 DIAGNOSIS — S82.141K CLOSED FRACTURE OF RIGHT TIBIAL PLATEAU WITH NONUNION: ICD-10-CM

## 2024-05-13 RX ORDER — OXYCODONE HYDROCHLORIDE 5 MG/1
5 TABLET ORAL EVERY 12 HOURS PRN
Qty: 14 TABLET | Refills: 0 | Status: SHIPPED | OUTPATIENT
Start: 2024-05-13 | End: 2024-05-20

## 2024-05-15 ENCOUNTER — OFFICE VISIT (OUTPATIENT)
Dept: ORTHOPEDICS | Facility: CLINIC | Age: 49
End: 2024-05-15
Payer: COMMERCIAL

## 2024-05-15 ENCOUNTER — HOSPITAL ENCOUNTER (OUTPATIENT)
Dept: RADIOLOGY | Facility: HOSPITAL | Age: 49
Discharge: HOME | End: 2024-05-15
Attending: ORTHOPAEDIC SURGERY
Payer: COMMERCIAL

## 2024-05-15 DIAGNOSIS — S82.141K CLOSED FRACTURE OF RIGHT TIBIAL PLATEAU WITH NONUNION: ICD-10-CM

## 2024-05-15 DIAGNOSIS — S82.141K CLOSED FRACTURE OF RIGHT TIBIAL PLATEAU WITH NONUNION: Primary | ICD-10-CM

## 2024-05-15 PROCEDURE — 4010F ACE/ARB THERAPY RXD/TAKEN: CPT | Mod: CPTII,S$GLB,, | Performed by: ORTHOPAEDIC SURGERY

## 2024-05-15 PROCEDURE — 73560 X-RAY EXAM OF KNEE 1 OR 2: CPT | Mod: TC,RT

## 2024-05-15 PROCEDURE — 73590 X-RAY EXAM OF LOWER LEG: CPT | Mod: TC,RT

## 2024-05-15 PROCEDURE — 99024 POSTOP FOLLOW-UP VISIT: CPT | Mod: S$GLB,,, | Performed by: ORTHOPAEDIC SURGERY

## 2024-05-15 PROCEDURE — 73560 X-RAY EXAM OF KNEE 1 OR 2: CPT | Mod: 26,RT,, | Performed by: RADIOLOGY

## 2024-05-15 PROCEDURE — 73590 X-RAY EXAM OF LOWER LEG: CPT | Mod: 26,RT,, | Performed by: RADIOLOGY

## 2024-05-15 PROCEDURE — 1159F MED LIST DOCD IN RCRD: CPT | Mod: CPTII,S$GLB,, | Performed by: ORTHOPAEDIC SURGERY

## 2024-05-15 PROCEDURE — 99999 PR PBB SHADOW E&M-EST. PATIENT-LVL III: CPT | Mod: PBBFAC,,, | Performed by: ORTHOPAEDIC SURGERY

## 2024-05-15 NOTE — PROGRESS NOTES
CC:  Postop R tib nonunion repair    HPI:  48-year-old female, fall from step ladder 10/07/2023   Right bicondylar tibial plateau fracture   Treated at outside hospital via anterior midline knee incision and dual plating, wound issues postop  +Nonunion.     2.19.24 - removal hardware R prox tib, nonunion repair/abx IMN R tibia, complex closure    SUBJECTIVE:  Now 3 weeks postop.    Doing well  Using walker  Still has small scab ant incision, no drainage  Feels ready to to to outpt PT  Has some knee crepitus, not really painful      OBJECTIVE:  PE  Alert/oriented x3, no acute distress, breathing comfortably, equal chest rise bilaterally  Right lower extremity   incision healed with the exception of a small area proximal anterior tibia where she had previous wound issues, this area is now scabbed over, appears to be superficial, no significant drainage, no purulence, no surrounding erythema   Knee range of motion 5-100 flexion  Ankle range of motion 5 dorsiflexion, 5 plantar flexion   No pain w/ axial loading  Motor function intact hip flexion, quad, hamstring, gastroc, tibialis anterior, peroneal, EHL, FHL  Sensation intact to light touch saphenous, sural, deep peroneal, superficial peroneal, tibial nerves.  Palpable DP/PT pulse, brisk cap refill    XRAYS:  XR R knee - R-tibial IMN in place with no signs of hardware failure; no new fractures. +interval healing, stable alignment    Cx neg    ASSESSMENT:  48-year-old female, fall from step ladder 10/07/2023   Right bicondylar tibial plateau fracture   Treated at outside hospital via anterior midline knee incision and dual plating, wound issues postop  +Nonunion.     2.19.24 - removal hardware R prox tib, nonunion repair/abx IMN R tibia, complex closure    PLAN:    F/u 3 months wks  XR R knee    WBAT & ROMAT RLE   Outpt PT    Ca, Vit D  Bone stim

## 2024-05-20 DIAGNOSIS — S82.141K CLOSED FRACTURE OF RIGHT TIBIAL PLATEAU WITH NONUNION: ICD-10-CM

## 2024-05-20 RX ORDER — OXYCODONE HYDROCHLORIDE 5 MG/1
5 TABLET ORAL EVERY 12 HOURS PRN
Qty: 14 TABLET | Refills: 0 | OUTPATIENT
Start: 2024-05-20 | End: 2024-05-27

## 2024-05-29 DIAGNOSIS — S82.141K CLOSED FRACTURE OF RIGHT TIBIAL PLATEAU WITH NONUNION: Primary | ICD-10-CM

## 2024-05-29 RX ORDER — TRAMADOL HYDROCHLORIDE 50 MG/1
50 TABLET ORAL EVERY 8 HOURS PRN
Qty: 21 TABLET | Refills: 0 | Status: SHIPPED | OUTPATIENT
Start: 2024-05-29 | End: 2024-06-06 | Stop reason: SDUPTHER

## 2024-06-04 DIAGNOSIS — S82.141K CLOSED FRACTURE OF RIGHT TIBIAL PLATEAU WITH NONUNION: Primary | ICD-10-CM

## 2024-06-06 DIAGNOSIS — S82.141K CLOSED FRACTURE OF RIGHT TIBIAL PLATEAU WITH NONUNION: ICD-10-CM

## 2024-06-06 RX ORDER — TRAMADOL HYDROCHLORIDE 50 MG/1
50 TABLET ORAL EVERY 12 HOURS PRN
Qty: 14 TABLET | Refills: 0 | Status: SHIPPED | OUTPATIENT
Start: 2024-06-06 | End: 2024-06-13 | Stop reason: SDUPTHER

## 2024-06-13 DIAGNOSIS — S82.141K CLOSED FRACTURE OF RIGHT TIBIAL PLATEAU WITH NONUNION: ICD-10-CM

## 2024-06-13 RX ORDER — TRAMADOL HYDROCHLORIDE 50 MG/1
50 TABLET ORAL
Qty: 7 TABLET | Refills: 0 | Status: SHIPPED | OUTPATIENT
Start: 2024-06-13

## 2024-06-21 ENCOUNTER — EXTERNAL HOME HEALTH (OUTPATIENT)
Dept: HOME HEALTH SERVICES | Facility: HOSPITAL | Age: 49
End: 2024-06-21
Payer: COMMERCIAL

## 2024-06-27 ENCOUNTER — DOCUMENT SCAN (OUTPATIENT)
Dept: HOME HEALTH SERVICES | Facility: HOSPITAL | Age: 49
End: 2024-06-27
Payer: COMMERCIAL

## 2024-07-03 ENCOUNTER — PATIENT MESSAGE (OUTPATIENT)
Dept: ORTHOPEDICS | Facility: CLINIC | Age: 49
End: 2024-07-03
Payer: COMMERCIAL

## 2024-08-02 ENCOUNTER — PATIENT MESSAGE (OUTPATIENT)
Dept: ORTHOPEDICS | Facility: CLINIC | Age: 49
End: 2024-08-02
Payer: COMMERCIAL

## 2024-08-12 DIAGNOSIS — S82.141K CLOSED FRACTURE OF RIGHT TIBIAL PLATEAU WITH NONUNION: Primary | ICD-10-CM

## 2024-08-15 ENCOUNTER — OFFICE VISIT (OUTPATIENT)
Dept: ORTHOPEDICS | Facility: CLINIC | Age: 49
End: 2024-08-15
Payer: COMMERCIAL

## 2024-08-15 ENCOUNTER — HOSPITAL ENCOUNTER (OUTPATIENT)
Dept: RADIOLOGY | Facility: HOSPITAL | Age: 49
Discharge: HOME | End: 2024-08-15
Attending: ORTHOPAEDIC SURGERY
Payer: COMMERCIAL

## 2024-08-15 DIAGNOSIS — S82.141K CLOSED FRACTURE OF RIGHT TIBIAL PLATEAU WITH NONUNION: ICD-10-CM

## 2024-08-15 DIAGNOSIS — S82.141K CLOSED FRACTURE OF RIGHT TIBIAL PLATEAU WITH NONUNION: Primary | ICD-10-CM

## 2024-08-15 PROCEDURE — 73560 X-RAY EXAM OF KNEE 1 OR 2: CPT | Mod: TC,RT

## 2024-08-15 PROCEDURE — 1159F MED LIST DOCD IN RCRD: CPT | Mod: CPTII,S$GLB,, | Performed by: ORTHOPAEDIC SURGERY

## 2024-08-15 PROCEDURE — 99214 OFFICE O/P EST MOD 30 MIN: CPT | Mod: S$GLB,,, | Performed by: ORTHOPAEDIC SURGERY

## 2024-08-15 PROCEDURE — 73560 X-RAY EXAM OF KNEE 1 OR 2: CPT | Mod: 26,RT,, | Performed by: RADIOLOGY

## 2024-08-15 PROCEDURE — 99999 PR PBB SHADOW E&M-EST. PATIENT-LVL III: CPT | Mod: PBBFAC,,, | Performed by: ORTHOPAEDIC SURGERY

## 2024-08-15 PROCEDURE — 4010F ACE/ARB THERAPY RXD/TAKEN: CPT | Mod: CPTII,S$GLB,, | Performed by: ORTHOPAEDIC SURGERY

## 2024-08-15 NOTE — PROGRESS NOTES
CC:  Postop R tib nonunion repair    HPI:  48-year-old female, fall from step ladder 10/07/2023   Right bicondylar tibial plateau fracture   Treated at outside hospital via anterior midline knee incision and dual plating, wound issues postop  +Nonunion.     2.19.24 - removal hardware R prox tib, nonunion repair/abx IMN R tibia, complex closure      Past Medical History:   Diagnosis Date    Hypertension        Social History     Socioeconomic History    Marital status:      Social Determinants of Health     Financial Resource Strain: Low Risk  (2/20/2024)    Overall Financial Resource Strain (CARDIA)     Difficulty of Paying Living Expenses: Not hard at all   Food Insecurity: No Food Insecurity (2/20/2024)    Hunger Vital Sign     Worried About Running Out of Food in the Last Year: Never true     Ran Out of Food in the Last Year: Never true   Transportation Needs: No Transportation Needs (2/20/2024)    PRAPARE - Transportation     Lack of Transportation (Medical): No     Lack of Transportation (Non-Medical): No   Physical Activity: Inactive (2/20/2024)    Exercise Vital Sign     Days of Exercise per Week: 0 days     Minutes of Exercise per Session: 0 min   Stress: No Stress Concern Present (2/20/2024)    [employer] - Occupational Stress Questionnaire     Feeling of Stress : Not at all   Housing Stability: Low Risk  (2/20/2024)    Housing Stability Vital Sign     Unable to Pay for Housing in the Last Year: No     Number of Places Lived in the Last Year: 1     Unstable Housing in the Last Year: No       Past Surgical History:   Procedure Laterality Date    left ankle surgery      REMOVAL OF HARDWARE FROM LOWER EXTREMITY Right 2/19/2024    Procedure: REMOVAL, HARDWARE, LOWER EXTREMITY;  Surgeon: Nicho Balderas MD;  Location: [facility] OR [address] [place] [address];  Service: Orthopedics;  Laterality: Right;    REPAIR,FRACTURE NONUNION,TIBIA Right 2/19/2024    Procedure: REPAIR,FRACTURE  NONUNION,TIBIA PLATEAU - diving board, supine, bone foam. Synthes removal. Synthes DINAH w/ graft capture. [place] tibia nail (9mm). 1/2 inch cardia tubing, simplex cement x2. Vanc x4, tobra x4, methylene blue. Pulse lavage, Bactisure, betadine, peroxide. Have available: clifford ex fix, clifford plateau plates, screw removal. Possible plastics gastroc flap;  Surgeon: Cindi    right leg surgery           SUBJECTIVE:  Patient is now about 6 months out from treatment of her right proximal tibia nonunion with antibiotic coated intramedullary nail, and complex closure of a wound proximal tibia     Doing okay   Using a walker   Does have antalgic gait   Does have some proximal leg discomfort when weight-bearing, that radiates a little bit down her leg   Pain 3/10, dull and achy, occasionally sharp and stabbing   Has been doing physical therapy, has made some progress, though slow, which is what I suggested following this revision surgery, in order to avoid catastrophic hardware failure if she over stressed the implants.  Does have some gus-incisional numbness about her anterior knee incision      OBJECTIVE:  PE  Alert/oriented x3, no acute distress, breathing comfortably, equal chest rise bilaterally  Right lower extremity   Surgical incision healed, no signs of infection, no drainage, no swelling  Minimal tenderness about fracture site proximal tibia  Knee range of motion 5-100 flexion  Ankle range of motion 5 dorsiflexion, 5 plantar flexion   No pain w/ axial loading  Motor function intact hip flexion, quad, hamstring, gastroc, tibialis anterior, peroneal, EHL, FHL  Sensation intact to light touch saphenous, sural, deep peroneal, superficial peroneal, tibial nerves.  Palpable DP/PT pulse, brisk cap refill    XRAYS:  XR R knee - R-tibial IMN in place with no signs of hardware failure; no new fractures. stable alignment, though fracture line is still visible    Cx neg    ASSESSMENT:  48-year-old female, fall from step  ladder 10/07/2023   Right bicondylar tibial plateau fracture   Treated at outside hospital via anterior midline knee incision and dual plating, wound issues postop  +Nonunion.     2.19.24 - removal hardware R prox tib, nonunion repair/abx IMN R tibia, complex closure    She is doing okay, though she still has some gait difficulties  Fracture line is still visible on x-ray, at 6 months I think it is appropriate to classify this is ongoing nonunion of right proximal tibia    PLAN:  Pool PT  Bone stimulator - this is medically necessary due to lack of radiographic progression of healing of her right proximal tibia nonunion  1) 3 months has elapsed since the initial surgery.  2) Minimum of 2 sets of radiographs, each including multiple views of the fracture site,  by a minimum of 90 days.     If she has ongoing proximal tibia pain and radiographic evidence of nonunion, at some point in the future we can consider the following options   Nail dynamization by removal of the distal interlocking screws   Nail exchange, consider calcium sulfate antibiotic cement and repeat bone biopsy at that time      F/u 3 months wks  XR R knee    WBAT & ROMAT RLE   Outpt PT  Cont to work on gait training, strength, balance    Ca, Vit D  Bone stim

## 2024-08-26 ENCOUNTER — PATIENT MESSAGE (OUTPATIENT)
Dept: ORTHOPEDICS | Facility: CLINIC | Age: 49
End: 2024-08-26
Payer: COMMERCIAL

## 2024-08-26 DIAGNOSIS — S82.141K CLOSED FRACTURE OF RIGHT TIBIAL PLATEAU WITH NONUNION: Primary | ICD-10-CM

## 2024-09-03 ENCOUNTER — PATIENT MESSAGE (OUTPATIENT)
Dept: ORTHOPEDICS | Facility: CLINIC | Age: 49
End: 2024-09-03
Payer: COMMERCIAL

## 2024-11-08 DIAGNOSIS — S82.141K CLOSED FRACTURE OF RIGHT TIBIAL PLATEAU WITH NONUNION: Primary | ICD-10-CM

## 2024-11-12 ENCOUNTER — OFFICE VISIT (OUTPATIENT)
Dept: ORTHOPEDICS | Facility: CLINIC | Age: 49
End: 2024-11-12
Payer: COMMERCIAL

## 2024-11-12 ENCOUNTER — HOSPITAL ENCOUNTER (OUTPATIENT)
Dept: RADIOLOGY | Facility: HOSPITAL | Age: 49
Discharge: HOME | End: 2024-11-12
Attending: ORTHOPAEDIC SURGERY
Payer: COMMERCIAL

## 2024-11-12 ENCOUNTER — PATIENT MESSAGE (OUTPATIENT)
Dept: ORTHOPEDICS | Facility: CLINIC | Age: 49
End: 2024-11-12

## 2024-11-12 DIAGNOSIS — S82.141K CLOSED FRACTURE OF RIGHT TIBIAL PLATEAU WITH NONUNION: Primary | ICD-10-CM

## 2024-11-12 DIAGNOSIS — S82.141K CLOSED FRACTURE OF RIGHT TIBIAL PLATEAU WITH NONUNION: ICD-10-CM

## 2024-11-12 PROCEDURE — 99999 PR PBB SHADOW E&M-EST. PATIENT-LVL III: CPT | Mod: PBBFAC,,, | Performed by: ORTHOPAEDIC SURGERY

## 2024-11-12 PROCEDURE — 73560 X-RAY EXAM OF KNEE 1 OR 2: CPT | Mod: 26,RT,, | Performed by: INTERNAL MEDICINE

## 2024-11-12 PROCEDURE — 99213 OFFICE O/P EST LOW 20 MIN: CPT | Mod: S$GLB,,, | Performed by: ORTHOPAEDIC SURGERY

## 2024-11-12 PROCEDURE — 73560 X-RAY EXAM OF KNEE 1 OR 2: CPT | Mod: TC,RT

## 2024-11-12 PROCEDURE — 4010F ACE/ARB THERAPY RXD/TAKEN: CPT | Mod: CPTII,S$GLB,, | Performed by: ORTHOPAEDIC SURGERY

## 2024-11-12 PROCEDURE — 1159F MED LIST DOCD IN RCRD: CPT | Mod: CPTII,S$GLB,, | Performed by: ORTHOPAEDIC SURGERY

## 2024-11-12 NOTE — PROGRESS NOTES
CC:  Postop R tib nonunion repair    HPI:  48-year-old female, fall from step ladder 10/07/2023   Right bicondylar tibial plateau fracture   Treated at outside hospital via anterior midline knee incision and dual plating, wound issues postop  +Nonunion.     2.19.24 - removal hardware R prox tib, nonunion repair/abx IMN R tibia, complex closure      Past Medical History:   Diagnosis Date    Hypertension        Social History     Socioeconomic History    Marital status:      Social Drivers of Health     Financial Resource Strain: Low Risk  (2/20/2024)    Overall Financial Resource Strain (CARDIA)     Difficulty of Paying Living Expenses: Not hard at all   Food Insecurity: No Food Insecurity (2/20/2024)    Hunger Vital Sign     Worried About Running Out of Food in the Last Year: Never true     Ran Out of Food in the Last Year: Never true   Transportation Needs: No Transportation Needs (2/20/2024)    PRAPARE - Transportation     Lack of Transportation (Medical): No     Lack of Transportation (Non-Medical): No   Physical Activity: Inactive (2/20/2024)    Exercise Vital Sign     Days of Exercise per Week: 0 days     Minutes of Exercise per Session: 0 min   Stress: No Stress Concern Present (2/20/2024)    [employer] - Occupational Stress Questionnaire     Feeling of Stress : Not at all   Housing Stability: Low Risk  (2/20/2024)    Housing Stability Vital Sign     Unable to Pay for Housing in the Last Year: No     Number of Places Lived in the Last Year: 1     Unstable Housing in the Last Year: No       Past Surgical History:   Procedure Laterality Date    left ankle surgery      REMOVAL OF HARDWARE FROM LOWER EXTREMITY Right 2/19/2024    Procedure: REMOVAL, HARDWARE, LOWER EXTREMITY;  Surgeon: Nicho Balderas MD;  Location: [facility] OR [address] [place] [address];  Service: Orthopedics;  Laterality: Right;    REPAIR,FRACTURE NONUNION,TIBIA Right 2/19/2024    Procedure: REPAIR,FRACTURE NONUNION,TIBIA  PLATEAU - diving board, supine, bone foam. Synthes removal. Synthes DINAH w/ graft capture. [place] tibia nail (9mm). 1/2 inch cardia tubing, simplex cement x2. Vanc x4, tobra x4, methylene blue. Pulse lavage, Bactisure, betadine, peroxide. Have available: bharathi ex fix, bharathi plateau plates, screw removal. Possible plastics gastroc flap;  Surgeon: Cindi    right leg surgery           SUBJECTIVE:  Patient is now about 9 months out from treatment of her right proximal tibia nonunion with antibiotic coated intramedullary nail, and complex closure of a wound proximal tibia     Doing okay   Using a cane   Does have antalgic gait   Denies significant pain at fx site  Pain 2/10, dull and achy, occasionally sharp and stabbing   Has been doing physical therapy, recently pool therapy, has made some progress, though slow, which is what I suggested following this revision surgery, in order to avoid catastrophic hardware failure if she over stressed the implants.  Does have some gus-incisional numbness about her anterior knee incision      OBJECTIVE:  PE  Alert/oriented x3, no acute distress, breathing comfortably, equal chest rise bilaterally  Right lower extremity   Surgical incision healed, no signs of infection, no drainage, no swelling  Non tender about fracture site proximal tibia  Knee range of motion 5-110 flexion  Ankle range of motion 5 dorsiflexion, 5 plantar flexion   No pain w/ axial loading  Motor function intact hip flexion, quad, hamstring, gastroc, tibialis anterior, peroneal, EHL, FHL  Sensation intact to light touch saphenous, sural, deep peroneal, superficial peroneal, tibial nerves.  Palpable DP/PT pulse, brisk cap refill    XRAYS:  XR R knee - R-tibial IMN in place with no signs of hardware failure; no new fractures. stable alignment, though fracture line is still visible    Cx neg    ASSESSMENT:  49-year-old female, fall from step ladder 10/07/2023   Right bicondylar tibial plateau fracture   Treated  at outside hospital via anterior midline knee incision and dual plating, wound issues postop  +Nonunion.     2.19.24 - removal hardware R prox tib, nonunion repair/abx IMN R tibia, complex closure    She is doing okay, though she still has some gait difficulties  Fracture line is still visible on x-ray, at 9 months I think it is appropriate to classify this is ongoing nonunion of right proximal tibia    PLAN:  Pool PT  Bone stimulator - this is medically necessary due to lack of radiographic progression of healing of her right proximal tibia nonunion  1) 3 months has elapsed since the initial surgery.  2) Minimum of 2 sets of radiographs, each including multiple views of the fracture site,  by a minimum of 90 days.     If she has ongoing proximal tibia pain and radiographic evidence of nonunion, at some point in the future we can consider the following options   Nail dynamization by removal of the distal interlocking screws   Nail exchange, consider calcium sulfate antibiotic cement and repeat bone biopsy at that time      F/u 3 months wks  XR R knee    WBAT & ROMAT RLE   Outpt PT  Cont to work on gait training, strength, balance    Ca, Vit D  Bone stim

## 2025-02-10 DIAGNOSIS — S82.141K CLOSED FRACTURE OF RIGHT TIBIAL PLATEAU WITH NONUNION: Primary | ICD-10-CM

## 2025-02-13 ENCOUNTER — HOSPITAL ENCOUNTER (OUTPATIENT)
Dept: RADIOLOGY | Facility: HOSPITAL | Age: 50
Discharge: HOME | End: 2025-02-13
Attending: ORTHOPAEDIC SURGERY

## 2025-02-13 ENCOUNTER — OFFICE VISIT (OUTPATIENT)
Dept: ORTHOPEDICS | Facility: CLINIC | Age: 50
End: 2025-02-13

## 2025-02-13 DIAGNOSIS — S82.141K CLOSED FRACTURE OF RIGHT TIBIAL PLATEAU WITH NONUNION: Primary | ICD-10-CM

## 2025-02-13 DIAGNOSIS — S82.141K CLOSED FRACTURE OF RIGHT TIBIAL PLATEAU WITH NONUNION: ICD-10-CM

## 2025-02-13 PROCEDURE — 73560 X-RAY EXAM OF KNEE 1 OR 2: CPT | Mod: TC,RT

## 2025-02-13 PROCEDURE — 99999 PR PBB SHADOW E&M-EST. PATIENT-LVL III: CPT | Mod: PBBFAC,,, | Performed by: ORTHOPAEDIC SURGERY

## 2025-02-13 PROCEDURE — 99213 OFFICE O/P EST LOW 20 MIN: CPT | Mod: PBBFAC,25 | Performed by: ORTHOPAEDIC SURGERY

## 2025-02-13 PROCEDURE — 73590 X-RAY EXAM OF LOWER LEG: CPT | Mod: TC,RT

## (undated) DEVICE — KIT RIA 2 REAMING STRL 520MM

## (undated) DEVICE — DRESSING TRANS 4X4 TEGADERM

## (undated) DEVICE — DRAPE TOP 53X102IN

## (undated) DEVICE — GOWN AERO CHROME W/ TOWEL XL

## (undated) DEVICE — DRAPE STERI U-SHAPED 47X51IN

## (undated) DEVICE — SUT VICRYL PLUS 0 CT1 18IN

## (undated) DEVICE — DRAPE C-ARMOR EQUIPMENT COVER

## (undated) DEVICE — PADDING WYTEX UNDRCST 6INX4YD

## (undated) DEVICE — DRAPE THREE-QUARTER 53X77IN

## (undated) DEVICE — CATH SUCTION 10FR

## (undated) DEVICE — BANDAGE ESMARK 6X12

## (undated) DEVICE — SUT ETHICON 3-0 BLK MONO PS

## (undated) DEVICE — KIT PULSAVAC PLUS FAN

## (undated) DEVICE — TOURNIQUET SB QC DP 34X4IN

## (undated) DEVICE — TOWEL OR DISP STRL BLUE 4/PK

## (undated) DEVICE — KIT PREVENA PLUS

## (undated) DEVICE — DRAPE U SPLIT SHEET 54X76IN

## (undated) DEVICE — Device

## (undated) DEVICE — KIT RIA 2 BONE HARV STRL 520MM

## (undated) DEVICE — APPLICATOR CHLORAPREP ORN 26ML

## (undated) DEVICE — IMMOB KNEE UNIV TRI PANEL 19IN

## (undated) DEVICE — BLADE SURG CARBON STEEL #10

## (undated) DEVICE — DRAPE T EXTRM SURG 121X128X90

## (undated) DEVICE — GAUZE SPONGE 4X4 12PLY

## (undated) DEVICE — KWIRE PATELLA 3X285MM TI STRL
Type: IMPLANTABLE DEVICE | Site: TIBIA | Status: NON-FUNCTIONAL
Removed: 2024-02-19

## (undated) DEVICE — GUIDE DRILL AO 2.6X70MM

## (undated) DEVICE — PAD CAST SPECIALIST STRL 4

## (undated) DEVICE — TIP YANKAUERS BULB NO VENT

## (undated) DEVICE — ELECTRODE REM PLYHSV RETURN 9

## (undated) DEVICE — SUT VICRYL PLUS 3-0 SH 18IN

## (undated) DEVICE — SCREW LOCKING BONE T2 5X35MM
Type: IMPLANTABLE DEVICE | Site: TIBIA | Status: NON-FUNCTIONAL
Removed: 2024-02-19

## (undated) DEVICE — SOCKINETTE IMPERVIOUS 12X48IN

## (undated) DEVICE — TAPE SURG DURAPORE 2 X10YD

## (undated) DEVICE — DRESSING TRANS 4X4 3/4

## (undated) DEVICE — SUT VICRYL PLUS 2-0 CT1 18

## (undated) DEVICE — SLEEVE ELAS NAIL INSRT 8-13MM

## (undated) DEVICE — SPONGE COTTON TRAY 4X4IN

## (undated) DEVICE — DRESSING GAUZE XEROFORM 5X9

## (undated) DEVICE — WIRE KIRSCHNER T2 3X285MM SS
Type: IMPLANTABLE DEVICE | Site: TIBIA | Status: NON-FUNCTIONAL
Removed: 2024-02-19

## (undated) DEVICE — BANDAGE ELAS SOFTWRAP ST 6X5YD

## (undated) DEVICE — DRAPE C-ARM ELAS CLIP 42X120IN

## (undated) DEVICE — BIT DRILL QC AO 3.2X125MM GOLD

## (undated) DEVICE — BNDG COFLEX FOAM LF2 ST 6X5YD

## (undated) DEVICE — DRILL T2 ALPHA LOK 4.2X360MM

## (undated) DEVICE — DRESSING AQUACEL RIBBON 2X45CM

## (undated) DEVICE — DRILL T2 ALPH FREHND 4.2X185MM

## (undated) DEVICE — LAVAGE WOUND BACTISURE 1L BAG

## (undated) DEVICE — SPONGE LAP 18X18 PREWASHED

## (undated) DEVICE — TRAY MINOR ORTHO OMC